# Patient Record
Sex: MALE | Race: WHITE | NOT HISPANIC OR LATINO | Employment: OTHER | URBAN - METROPOLITAN AREA
[De-identification: names, ages, dates, MRNs, and addresses within clinical notes are randomized per-mention and may not be internally consistent; named-entity substitution may affect disease eponyms.]

---

## 2017-01-03 DIAGNOSIS — Z94.1 HEART REPLACED BY TRANSPLANT: Primary | ICD-10-CM

## 2017-01-04 RX ORDER — AMLODIPINE BESYLATE 5 MG/1
TABLET ORAL
Qty: 90 TABLET | Refills: 6 | Status: SHIPPED | OUTPATIENT
Start: 2017-01-04 | End: 2017-06-26 | Stop reason: SDUPTHER

## 2017-01-12 ENCOUNTER — HOSPITAL ENCOUNTER (OUTPATIENT)
Dept: CARDIOLOGY | Facility: CLINIC | Age: 67
Discharge: HOME | DRG: 247 | End: 2017-01-12
Payer: MEDICARE

## 2017-01-12 DIAGNOSIS — I25.10 CORONARY ARTERY DISEASE INVOLVING NATIVE CORONARY ARTERY OF NATIVE HEART WITHOUT ANGINA PECTORIS: Primary | ICD-10-CM

## 2017-01-12 DIAGNOSIS — I25.10 CORONARY ARTERY DISEASE INVOLVING NATIVE CORONARY ARTERY OF NATIVE HEART WITHOUT ANGINA PECTORIS: ICD-10-CM

## 2017-01-12 LAB
DIASTOLIC DYSFUNCTION: NO
ESTIMATED PA SYSTOLIC PRESSURE: 23
MITRAL VALVE REGURGITATION: NORMAL
RETIRED EF AND QEF - SEE NOTES: 60 (ref 55–65)
TRICUSPID VALVE REGURGITATION: NORMAL

## 2017-01-12 PROCEDURE — 93306 TTE W/DOPPLER COMPLETE: CPT | Mod: 26,S$PBB,, | Performed by: INTERNAL MEDICINE

## 2017-01-13 ENCOUNTER — SURGERY (OUTPATIENT)
Age: 67
End: 2017-01-13

## 2017-01-13 PROBLEM — I25.10 CAD (CORONARY ARTERY DISEASE): Status: ACTIVE | Noted: 2017-01-13

## 2017-01-16 ENCOUNTER — DOCUMENTATION ONLY (OUTPATIENT)
Dept: CARDIOLOGY | Facility: HOSPITAL | Age: 67
End: 2017-01-16

## 2017-01-31 ENCOUNTER — LAB VISIT (OUTPATIENT)
Dept: LAB | Facility: HOSPITAL | Age: 67
End: 2017-01-31
Attending: INTERNAL MEDICINE
Payer: MEDICARE

## 2017-01-31 ENCOUNTER — OFFICE VISIT (OUTPATIENT)
Dept: HEMATOLOGY/ONCOLOGY | Facility: CLINIC | Age: 67
End: 2017-01-31
Payer: MEDICARE

## 2017-01-31 VITALS
SYSTOLIC BLOOD PRESSURE: 138 MMHG | HEART RATE: 62 BPM | TEMPERATURE: 98 F | BODY MASS INDEX: 29.1 KG/M2 | WEIGHT: 202.81 LBS | DIASTOLIC BLOOD PRESSURE: 68 MMHG

## 2017-01-31 DIAGNOSIS — D72.829 LEUKOCYTOSIS, UNSPECIFIED TYPE: ICD-10-CM

## 2017-01-31 DIAGNOSIS — D72.829 LEUKOCYTOSIS, UNSPECIFIED TYPE: Primary | ICD-10-CM

## 2017-01-31 LAB
BASOPHILS # BLD AUTO: 0.03 K/UL
BASOPHILS NFR BLD: 0.3 %
DIFFERENTIAL METHOD: ABNORMAL
EOSINOPHIL # BLD AUTO: 0.1 K/UL
EOSINOPHIL NFR BLD: 1.2 %
ERYTHROCYTE [DISTWIDTH] IN BLOOD BY AUTOMATED COUNT: 13.3 %
HCT VFR BLD AUTO: 43.5 %
HGB BLD-MCNC: 14.9 G/DL
LYMPHOCYTES # BLD AUTO: 2 K/UL
LYMPHOCYTES NFR BLD: 17.3 %
MCH RBC QN AUTO: 31 PG
MCHC RBC AUTO-ENTMCNC: 34.3 %
MCV RBC AUTO: 91 FL
MONOCYTES # BLD AUTO: 1.3 K/UL
MONOCYTES NFR BLD: 10.8 %
NEUTROPHILS # BLD AUTO: 8.2 K/UL
NEUTROPHILS NFR BLD: 70.4 %
PLATELET # BLD AUTO: 290 K/UL
PMV BLD AUTO: 9.6 FL
RBC # BLD AUTO: 4.8 M/UL
WBC # BLD AUTO: 11.59 K/UL

## 2017-01-31 PROCEDURE — 99999 PR PBB SHADOW E&M-EST. PATIENT-LVL III: CPT | Mod: PBBFAC,,, | Performed by: INTERNAL MEDICINE

## 2017-01-31 PROCEDURE — 99213 OFFICE O/P EST LOW 20 MIN: CPT | Mod: PBBFAC,PO | Performed by: INTERNAL MEDICINE

## 2017-01-31 PROCEDURE — 99214 OFFICE O/P EST MOD 30 MIN: CPT | Mod: S$PBB,,, | Performed by: INTERNAL MEDICINE

## 2017-01-31 NOTE — PROGRESS NOTES
Reason for visit: Chronic leukocytosis    HPI:   The patient is a 67-year-old  male who presents to the hematology oncology clinic today to discuss further evaluation and management recommendations for chronic leukocytosis.  I have reviewed all of the patient's clinical history available in the medical record and have utilized this in my evaluation and management recommendations today including his recent ER visits for treatment of buttock abscess.  Today the patient reports that overall he feels well and has no specific complaints.  He denies any fevers, chills or night sweats.  He denies any loss of appetite or unintentional weight loss.  He denies any melena, hematochezia, hematemesis, hemoptysis or hematuria.  He denies any bowel or urinary complaints.  He denies any nausea, vomiting or abdominal pain.  The patient is status post orthotopic heart transplant in 2003 and is on chronic immunosuppression with Prograf and prednisone.    PAST MEDICAL HISTORY:   1.  Coronary artery disease s/p PCI with stents  2.  Hypertension  3.  Stage III chronic kidney disease  4.  Dyslipidemia  5.  Chronic insomnia  6.  GERD  7.  Avascular necrosis of bilateral femoral heads likely due to chronic corticosteroid therapy    SURGICAL HISTORY:   1.  Orthotopic heart transplant in 2003  2.  Hemorrhoidectomy  3.  Solitary parathyroidectomy in 1993  4.  Tonsillectomy    FAMILY HISTORY: He denies any immediate family members with cancer or bleeding/clotting disorders.    SOCIAL HISTORY: He reports a history of cigarette smoking and smoking a pipe and quit in June 2016.  He denies any alcohol use or recreational drug use.  He is a retired  from [employer].  He is  and has 2 sons.  He lives in [place], [place].    ALLERGIES: Reviewed on medication card.    MEDICATIONS: [Medcard has been reviewed and/or reconciled.]    REVIEW OF SYSTEMS:   GENERAL: [No fevers, chills or sweats. No fatigue, weight  loss or loss of appetite.]  HEENT: [No blurred vision, tinnitus, nasal discharge, sorethroat or dysphagia.]  HEART: [No chest pain, palpitations or shortness of breath.]    LUNGS: [No cough, hemoptysis or breathing problems.]  ABDOMEN: [No abdominal pain, nausea, vomiting, diarrhea, constipation or melena.]  GENITOURINARY: [No dysuria, bleeding or malodorous discharge.]  NEURO: [No headache, dizziness or vertigo.]  HEMATOLOGY: [No easy bruising, spontaneous bleeding or blood clots in the past].  MUSCULOSKELETAL: [No arthralgias, myalgias or bone pains.]  SKIN: [No rashes or skin lesions.]  PSYCHIATRY: [No depression or anxiety.]    PHYSICAL EXAMINATION:   VS: Reviewed on nurse's notes.  APPEARANCE: The patient is a well-developed, well-nourished and well-groomed  male who appears in no acute distress.  HEENT: No scleral icterus. Both external auditory canals clear. No oral ulcers, lesions. Throat clear  HEAD: No sinus tenderness.  NECK: Supple. No palpable lymphadenopathy. Thyroid non-tender, no palpable masses  CHEST: Breath sounds clear bilaterally. No rales. No rhonchi. Unlabored respirations.  CARDIOVASCULAR: Normal S1, S2. Normal rate. Regular rhythm.  ABDOMEN: Bowel sounds normal. No tenderness. No abdominal distention. No hepatomegaly. No splenomegaly.  LYMPHATIC: No palpable supraclavicular, axillary nodes  EXTREMITIES: No clubbing, cyanosis, edema  SKIN: No lesions. No petechiae. No ecchymoses. No induration or nodules  NEUROLOGIC: No focal findings. Alert & Oriented x 3. Mood appropriate to affect    LABS:   Reviewed    IMAGING:  Reviewed    IMPRESSION:  1.  Chronic leukocytosis - Resolved    PLAN:  1.  I had a detailed discussion with the patient today with regard to the various possible etiologies for his chronic leukocytosis.  Lab work up to evaluate for reactive versus autonomous causes in the past for this favors reactive etiology.  Review of the patient's available lab work shows that this  has been present for greater than 12 years also argues for a benign/reactive etiology.  We also discussed that this could be related to his history of heart transplant and being on chronic prednisone.  Another significant contributing factor is his chronic tobacco abuse.  Of note he has had a normal WBC count since Dec 2016 since he stopped smoking.  2. Results of CT scans of the thorax/abdomen/pelvis done in June 2016 to evaluate for any evidence of lymphadenopathy as the etiology of his chronic leukocytosis were previously discussed in detail.  There is no evidence of pathologic lymphadenopathy.    Follow up with me as needed.  He knows to call sooner for any additional questions or new problems.    Cali Robles MD

## 2017-03-02 RX ORDER — ZOLPIDEM TARTRATE 10 MG/1
10 TABLET ORAL NIGHTLY
Qty: 30 TABLET | Refills: 5 | Status: SHIPPED | OUTPATIENT
Start: 2017-03-02 | End: 2017-09-22

## 2017-03-06 ENCOUNTER — DOCUMENTATION ONLY (OUTPATIENT)
Dept: TRANSPLANT | Facility: CLINIC | Age: 67
End: 2017-03-06

## 2017-03-06 NOTE — PROGRESS NOTES
Scheduled routine 3 month post heart transplant lab at [facility] for March 28. Sent appt to pt in mail.

## 2017-03-09 ENCOUNTER — HOSPITAL ENCOUNTER (EMERGENCY)
Facility: HOSPITAL | Age: 67
Discharge: HOME | End: 2017-03-09
Attending: EMERGENCY MEDICINE
Payer: MEDICARE

## 2017-03-09 VITALS
OXYGEN SATURATION: 100 % | DIASTOLIC BLOOD PRESSURE: 79 MMHG | HEIGHT: 70 IN | HEART RATE: 101 BPM | WEIGHT: 190 LBS | TEMPERATURE: 98 F | BODY MASS INDEX: 27.2 KG/M2 | SYSTOLIC BLOOD PRESSURE: 141 MMHG | RESPIRATION RATE: 20 BRPM

## 2017-03-09 DIAGNOSIS — L02.31 ABSCESS OF BUTTOCK, LEFT: Primary | ICD-10-CM

## 2017-03-09 PROCEDURE — 25000003 PHARM REV CODE 250: Performed by: NURSE PRACTITIONER

## 2017-03-09 PROCEDURE — 10060 I&D ABSCESS SIMPLE/SINGLE: CPT

## 2017-03-09 PROCEDURE — 99283 EMERGENCY DEPT VISIT LOW MDM: CPT | Mod: 25

## 2017-03-09 RX ORDER — LIDOCAINE HYDROCHLORIDE 10 MG/ML
10 INJECTION INFILTRATION; PERINEURAL
Status: COMPLETED | OUTPATIENT
Start: 2017-03-09 | End: 2017-03-09

## 2017-03-09 RX ORDER — SULFAMETHOXAZOLE AND TRIMETHOPRIM 800; 160 MG/1; MG/1
1 TABLET ORAL 2 TIMES DAILY
Qty: 14 TABLET | Refills: 0 | Status: SHIPPED | OUTPATIENT
Start: 2017-03-09 | End: 2017-03-16

## 2017-03-09 RX ADMIN — LIDOCAINE HYDROCHLORIDE 10 ML: 10 INJECTION, SOLUTION INFILTRATION; PERINEURAL at 11:03

## 2017-03-09 NOTE — DISCHARGE INSTRUCTIONS
Abscess (Incision & Drainage)  An abscess (sometimes called a boil) occurs when bacteria get trapped under the skin and start to grow. Pus forms inside the abscess as the body responds to the bacteria. An abscess can happen with an insect bite, ingrown hair, blocked oil gland, pimple, cyst, or puncture wound.  Your healthcare provider has drained the pus from your abscess. If the abscess pocket was large, your healthcare provider may have inserted gauze packing. Your provider will need to remove and possibly replace it on your next visit. You may not need antibiotics to treat a simple abscess, unless the infection is spreading into the skin around the wound (cellulitis).  Healing of the wound will take about 1 to 2 weeks, depending on the size of the abscess. Healthy tissue will grow from the bottom and sides of the opening until it seals over.  Home care  These tips can help your wound heal:  · The wound may drain for the first 2 days. Cover the wound with a clean dry dressing. If the dressing becomes soaked with blood or pus, change it.  · If a gauze packing was placed inside the abscess cavity, you may be told to remove it yourself. You may do this in the shower. Once the packing is removed, you should wash the area in the shower or bath 3 to 4 times a day, until the skin opening has closed. Make sure you wash your hands after changing the packing or cleaning the wound.  · If you were prescribed antibiotics, take them as directed until they are all gone.  · You may use acetaminophen or ibuprofen to control pain, unless another pain medicine was prescribed. If you have liver disease or ever had a stomach ulcer, talk with your doctor before using these medicines.  Follow-up care  Follow up with your healthcare provider, or as advised. If a gauze packing was inserted in your wound, it should be removed in 1 to 2 days. Check your wound every day for the signs of worsening infection listed below.  When to seek  medical advice  Call your healthcare provider right away if any of these occur:  · Increasing redness or swelling  · Red streaks in the skin leading away from the wound  · Increasing local pain or swelling  · Continued pus draining from the wound 2 days after treatment  · Fever of 100.4ºF (38ºC) or higher, or as directed by your healthcare provider  · Boil returns when you are at home  Date Last Reviewed: 9/1/2016  © 3919-2134 [employer], [employer]. [address] [place] [address]. All rights reserved. This information is not intended as a substitute for professional medical care. Always follow your healthcare professional's instructions.

## 2017-03-09 NOTE — ED AVS SNAPSHOT
[facility]  [address]  [place] [address]               Jesse DUFFY David Richardson   3/9/2017 10:56 AM   ED    Description:  Male : 1950   Department:  [facility]           Your Care was Coordinated By:     Provider Role From To    Anthony Whitmore NP Nurse Practitioner 17 1056 --      Reason for Visit     Abscess           Diagnoses this Visit        Comments    Abscess of buttock, left    -  Primary       ED Disposition     None           To Do List           Follow-up Information     Follow up with Nathan Duncan Jr, MD. Schedule an appointment as soon as possible for a visit in 2 days.    Specialty:  Internal Medicine    Contact information:    [address] [place] [id number] [phone]        [facility]     [facility] -  Assistance  Registered nurses in the [facility] provide clinical advisement, health education, appointment booking, and other advisory services.  Call for this free service at [phone].             Medications           Message regarding Medications     Verify the changes and/or additions to your medication regime listed below are the same as discussed with your clinician today.  If any of these changes or additions are incorrect, please notify your healthcare provider.        These medications were administered today        Dose Freq    lidocaine HCL 10 mg/ml (1%) injection 10 mL 10 mL ED 1 Time    Sig: 10 mLs by Infiltration route ED 1 Time.    Class: Normal    Route: Infiltration           Verify that the below list of medications is an accurate representation of the medications you are currently taking.  If none reported, the list may be blank. If incorrect, please contact your healthcare provider. Carry this list with you in case of emergency.           Current Medications     amlodipine (NORVASC) 5 MG tablet TAKE 2 TABLETS (10MG) IN MORNING AND 1 TABLET (5MG)  "IN EVENING    aspirin (ECOTRIN) 81 MG EC tablet Take 81 mg by mouth every evening.     atorvastatin (LIPITOR) 40 MG tablet TAKE (2) TABLETS DAILY.    clopidogrel (PLAVIX) 75 mg tablet TAKE ONE TABLET DAILY    folic acid-vit B6-vit B12 (FOLTX) 2.5-25-2 mg tablet Take 1 tablet by mouth once daily.      furosemide (LASIX) 20 MG tablet TAKE (1) TABLET DAILY.    hydrALAZINE (APRESOLINE) 25 MG tablet TAKE  (1)  TABLET  THREE TIMES DAILY.    lidocaine HCL 10 mg/ml (1%) injection 10 mL 10 mLs by Infiltration route ED 1 Time.    omega-3 acid ethyl esters (LOVAZA) 1 gram capsule Take 2 capsules (2 g total) by mouth 2 (two) times daily.    predniSONE (DELTASONE) 5 MG tablet TAKE (1) TABLET DAILY.    ranitidine (ZANTAC) 150 MG tablet Take 1 tablet (150 mg total) by mouth once daily.    tacrolimus (PROGRAF) 1 MG Cap TAKE 2 CAPSULES BY MOUTH IN THE MORNING AND 3 CAPS IN THE EVENING    ZETIA 10 mg tablet TAKE 1 TABLET ONCE DAILY IN THE EVENING    zolpidem (AMBIEN) 10 mg Tab Take 1 tablet (10 mg total) by mouth every evening.           Clinical Reference Information           Your Vitals Were     BP Pulse Temp Resp Height Weight    154/88 (BP Location: Right arm, Patient Position: Sitting) 118 98.1 °F (36.7 °C) (Oral) 18 5' 10" (1.778 m) 86.2 kg (190 lb)    BMI                27.26 kg/m2          Allergies as of 3/9/2017        Reactions    Shellfish Containing Products     Soft shell crab only      Immunizations Administered on Date of Encounter - 3/9/2017     None      ED Micro, Lab, POCT     None      ED Imaging Orders     None        Discharge Instructions         Abscess (Incision & Drainage)  An abscess (sometimes called a boil) occurs when bacteria get trapped under the skin and start to grow. Pus forms inside the abscess as the body responds to the bacteria. An abscess can happen with an insect bite, ingrown hair, blocked oil gland, pimple, cyst, or puncture wound.  Your healthcare provider has drained the pus from your " abscess. If the abscess pocket was large, your healthcare provider may have inserted gauze packing. Your provider will need to remove and possibly replace it on your next visit. You may not need antibiotics to treat a simple abscess, unless the infection is spreading into the skin around the wound (cellulitis).  Healing of the wound will take about 1 to 2 weeks, depending on the size of the abscess. Healthy tissue will grow from the bottom and sides of the opening until it seals over.  Home care  These tips can help your wound heal:  · The wound may drain for the first 2 days. Cover the wound with a clean dry dressing. If the dressing becomes soaked with blood or pus, change it.  · If a gauze packing was placed inside the abscess cavity, you may be told to remove it yourself. You may do this in the shower. Once the packing is removed, you should wash the area in the shower or bath 3 to 4 times a day, until the skin opening has closed. Make sure you wash your hands after changing the packing or cleaning the wound.  · If you were prescribed antibiotics, take them as directed until they are all gone.  · You may use acetaminophen or ibuprofen to control pain, unless another pain medicine was prescribed. If you have liver disease or ever had a stomach ulcer, talk with your doctor before using these medicines.  Follow-up care  Follow up with your healthcare provider, or as advised. If a gauze packing was inserted in your wound, it should be removed in 1 to 2 days. Check your wound every day for the signs of worsening infection listed below.  When to seek medical advice  Call your healthcare provider right away if any of these occur:  · Increasing redness or swelling  · Red streaks in the skin leading away from the wound  · Increasing local pain or swelling  · Continued pus draining from the wound 2 days after treatment  · Fever of 100.4ºF (38ºC) or higher, or as directed by your healthcare provider  · Boil returns when  you are at home  Date Last Reviewed: 9/1/2016  © 9214-2906 [employer], [employer]. [address] [place] [address], [place] [address]. All rights reserved. This information is not intended as a substitute for professional medical care. Always follow your healthcare professional's instructions.          Your Scheduled Appointments     Mar 28, 2017  8:05 AM CDT   [facility] Lab [facility]    [address] [facility] [address] [phone]            Aug 23, 2017  1:20 PM CDT   Established Patient Visit with Martinez Hoskins IV, MD   [facility]    [address] [facility] [address] [phone]               [facility]  complies with applicable Federal civil rights laws and does not discriminate on the basis of race, color, national origin, age, disability, or sex.        Language Assistance Services     ATTENTION: Language assistance services are available, free of charge. Please call [phone].      ATENCIÓN: Si habla español, tiene a salcido disposición servicios gratuitos de asistencia lingüística. Llame al [phone].     CHÚ Ý: N?u b?n nói Ti?ng Vi?t, có các d?ch v? h? tr? ngôn ng? mi?n phí dành cho b?n. G?i s? [phone].

## 2017-03-09 NOTE — ED PROVIDER NOTES
Encounter Date: 3/9/2017       History     Chief Complaint   Patient presents with    Abscess     on buttox, PCP afraid to drain it in office due to how close it was to the rectum     Review of patient's allergies indicates:   Allergen Reactions    Shellfish containing products      Soft shell crab only     HPI Comments: 67 year old male with complaint of abscess to left buttock X 3-4 days.  Reports moderate pain.  No fever or chills.  No radiation of pain.  No vomiting.      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    Tubular adenoma of colon 10/10/2012     Past Surgical History:   Procedure Laterality Date    CARDIAC CATHETERIZATION      COLONOSCOPY N/A 9/2/2016    Procedure: COLONOSCOPY;  Surgeon: Stef Suazo MD;  [facility];  Service: Endoscopy;  Laterality: N/A;    HEART TRANSPLANT      HEART TRANSPLANT      HEMORRHOID SURGERY      PARATHYROID GLAND SURGERY      SKIN BIOPSY      TONSILLECTOMY       Family History   Problem Relation Age of Onset    Hypertension Mother     Heart attack Mother     Hypertension Father     Heart attack Father     Heart failure Father     Heart attack Sister     Heart attack Brother     Heart attack Maternal Grandmother     Heart failure Maternal Grandmother     Heart attack Maternal Grandfather     Heart failure Maternal Grandfather     Heart failure Sister     Heart failure Brother     Cancer Sister     Melanoma Neg Hx     Eczema Neg Hx     Lupus Neg Hx     Psoriasis Neg Hx      Social History   Substance Use Topics    Smoking status: Former Smoker     Types: Pipe     Quit date: 8/1/2016    Smokeless tobacco: Never Used    Alcohol use 0.0 oz/week      Comment: rare alcohol use     Review of Systems   Constitutional: Negative for  fever.   HENT: Negative for sore throat.    Respiratory: Negative for shortness of breath.    Cardiovascular: Negative for chest pain.   Gastrointestinal: Negative for nausea.   Genitourinary: Negative for dysuria.   Musculoskeletal: Negative for back pain.   Skin: Negative for rash.        Abscess to buttock   Neurological: Negative for weakness.   Hematological: Does not bruise/bleed easily.       Physical Exam   Initial Vitals   BP Pulse Resp Temp SpO2   03/09/17 1054 03/09/17 1054 03/09/17 1054 03/09/17 1054 --   154/88 118 18 98.1 °F (36.7 °C)      Physical Exam    Nursing note and vitals reviewed.  Constitutional: He appears well-developed and well-nourished.   HENT:   Head: Normocephalic and atraumatic.   Eyes: Conjunctivae are normal. Pupils are equal, round, and reactive to light.   Neck: Normal range of motion. Neck supple.   Cardiovascular: Normal rate, regular rhythm, normal heart sounds and intact distal pulses.   Pulmonary/Chest: Breath sounds normal.   Abdominal: Soft. There is no rebound and no guarding.   Musculoskeletal: Normal range of motion.   Neurological: He is alert.   Skin: Skin is warm and dry.   2-3 cm area of fluctuance and tenderness left buttock, no extension into anus   Psychiatric: He has a normal mood and affect. His behavior is normal. Thought content normal.         ED Course   I & D - Incision and Drainage  Date/Time: 3/9/2017 11:10 AM  Performed by: ANTHONY ZENG  Authorized by: GRZEGORZ BARNARD   Comments: Abscess prepped with betadine, injected with 3 cc plain lidocaine, incision made with 11 blade, pus expressed, packed with 1/4 inch plain packing        Labs Reviewed - No data to display                            ED Course     Clinical Impression:   The encounter diagnosis was Abscess of buttock, left.          Anthony Zeng NP  03/09/17 1126

## 2017-03-09 NOTE — ED NOTES
Patient reports redness with swelling to left buttock.    Patient identifiers verified and correct for Jesse TATI Hernandez Jr..    LOC: The patient is awake, alert and aware of environment with an appropriate affect, the patient is oriented x 3 and speaking appropriately.  APPEARANCE: Patient resting comfortably and in no acute distress, patient is clean and well groomed, patient's clothing is properly fastened.  SKIN: The skin is warm and dry, color consistent with ethnicity, patient has normal skin turgor and moist mucus membranes, skin intact, no breakdown or bruising noted.Redness with swelling to left buttock.  MUSCULOSKELETAL: Patient moving all extremities spontaneously.  RESPIRATORY: Airway is open and patent, respirations are spontaneous.  CARDIAC: Patient has a normal rate, no periphreal edema noted, capillary refill < 3 seconds.  ABDOMEN: Soft and non tender to palpation.

## 2017-03-28 ENCOUNTER — LAB VISIT (OUTPATIENT)
Dept: LAB | Facility: HOSPITAL | Age: 67
End: 2017-03-28
Attending: INTERNAL MEDICINE
Payer: MEDICARE

## 2017-03-28 DIAGNOSIS — E78.2 MIXED HYPERLIPIDEMIA: ICD-10-CM

## 2017-03-28 DIAGNOSIS — I10 ESSENTIAL HYPERTENSION: ICD-10-CM

## 2017-03-28 DIAGNOSIS — Z79.899 ENCOUNTER FOR LONG-TERM (CURRENT) USE OF MEDICATIONS: ICD-10-CM

## 2017-03-28 DIAGNOSIS — R06.02 SHORTNESS OF BREATH: ICD-10-CM

## 2017-03-28 DIAGNOSIS — Z94.1 STATUS POST HEART TRANSPLANT: ICD-10-CM

## 2017-03-28 DIAGNOSIS — T86.20 COMPLICATION OF HEART TRANSPLANT: ICD-10-CM

## 2017-03-28 LAB
ALBUMIN SERPL BCP-MCNC: 3.6 G/DL
ALP SERPL-CCNC: 66 U/L
ALT SERPL W/O P-5'-P-CCNC: 28 U/L
ANION GAP SERPL CALC-SCNC: 9 MMOL/L
AST SERPL-CCNC: 19 U/L
BASOPHILS # BLD AUTO: 0.03 K/UL
BASOPHILS NFR BLD: 0.2 %
BILIRUB SERPL-MCNC: 1.3 MG/DL
BNP SERPL-MCNC: 56 PG/ML
BUN SERPL-MCNC: 20 MG/DL
CALCIUM SERPL-MCNC: 8.8 MG/DL
CHLORIDE SERPL-SCNC: 105 MMOL/L
CHOLEST/HDLC SERPL: 3.3 {RATIO}
CO2 SERPL-SCNC: 27 MMOL/L
CREAT SERPL-MCNC: 1.4 MG/DL
DIFFERENTIAL METHOD: ABNORMAL
EOSINOPHIL # BLD AUTO: 0.3 K/UL
EOSINOPHIL NFR BLD: 2.3 %
ERYTHROCYTE [DISTWIDTH] IN BLOOD BY AUTOMATED COUNT: 13.5 %
EST. GFR  (AFRICAN AMERICAN): 58.8 ML/MIN/1.73 M^2
EST. GFR  (NON AFRICAN AMERICAN): 50.9 ML/MIN/1.73 M^2
GLUCOSE SERPL-MCNC: 126 MG/DL
HCT VFR BLD AUTO: 45.2 %
HDL/CHOLESTEROL RATIO: 30.4 %
HDLC SERPL-MCNC: 115 MG/DL
HDLC SERPL-MCNC: 35 MG/DL
HGB BLD-MCNC: 15 G/DL
LDLC SERPL CALC-MCNC: 30.2 MG/DL
LYMPHOCYTES # BLD AUTO: 2.5 K/UL
LYMPHOCYTES NFR BLD: 20.6 %
MAGNESIUM SERPL-MCNC: 1.3 MG/DL
MCH RBC QN AUTO: 29.9 PG
MCHC RBC AUTO-ENTMCNC: 33.2 %
MCV RBC AUTO: 90 FL
MONOCYTES # BLD AUTO: 1.6 K/UL
MONOCYTES NFR BLD: 12.9 %
NEUTROPHILS # BLD AUTO: 7.8 K/UL
NEUTROPHILS NFR BLD: 63.3 %
NONHDLC SERPL-MCNC: 80 MG/DL
PLATELET # BLD AUTO: 315 K/UL
PMV BLD AUTO: 9.7 FL
POTASSIUM SERPL-SCNC: 4.1 MMOL/L
PROT SERPL-MCNC: 6.7 G/DL
RBC # BLD AUTO: 5.02 M/UL
SODIUM SERPL-SCNC: 141 MMOL/L
TRIGL SERPL-MCNC: 249 MG/DL
WBC # BLD AUTO: 12.34 K/UL

## 2017-03-28 PROCEDURE — 83880 ASSAY OF NATRIURETIC PEPTIDE: CPT

## 2017-03-28 PROCEDURE — 36415 COLL VENOUS BLD VENIPUNCTURE: CPT | Mod: PO

## 2017-03-28 PROCEDURE — 83036 HEMOGLOBIN GLYCOSYLATED A1C: CPT

## 2017-03-28 PROCEDURE — 85025 COMPLETE CBC W/AUTO DIFF WBC: CPT

## 2017-03-28 PROCEDURE — 80197 ASSAY OF TACROLIMUS: CPT

## 2017-03-28 PROCEDURE — 83735 ASSAY OF MAGNESIUM: CPT

## 2017-03-28 PROCEDURE — 80061 LIPID PANEL: CPT

## 2017-03-28 PROCEDURE — 80053 COMPREHEN METABOLIC PANEL: CPT

## 2017-03-29 LAB
ESTIMATED AVG GLUCOSE: 180 MG/DL
HBA1C MFR BLD HPLC: 7.9 %
TACROLIMUS BLD-MCNC: 16.8 NG/ML

## 2017-03-31 ENCOUNTER — TELEPHONE (OUTPATIENT)
Dept: TRANSPLANT | Facility: CLINIC | Age: 67
End: 2017-03-31

## 2017-03-31 NOTE — TELEPHONE ENCOUNTER
Spoke to pt about routine labs. CBC and CMP essentially normal. Issues still with his lipids though they have improved and A1C slightly more elevated at 7.9. Pt said he did take his prograf prior to lab since he received the computer print-out regarding being able to take his meds.

## 2017-05-15 ENCOUNTER — TELEPHONE (OUTPATIENT)
Dept: TRANSPLANT | Facility: CLINIC | Age: 67
End: 2017-05-15

## 2017-05-15 DIAGNOSIS — Z94.1 HEART REPLACED BY TRANSPLANT: Primary | ICD-10-CM

## 2017-05-19 ENCOUNTER — LAB VISIT (OUTPATIENT)
Dept: LAB | Facility: HOSPITAL | Age: 67
End: 2017-05-19
Attending: INTERNAL MEDICINE
Payer: MEDICARE

## 2017-05-19 DIAGNOSIS — I10 ESSENTIAL HYPERTENSION: ICD-10-CM

## 2017-05-19 DIAGNOSIS — E78.2 MIXED HYPERLIPIDEMIA: ICD-10-CM

## 2017-05-19 DIAGNOSIS — Z94.1 STATUS POST HEART TRANSPLANT: ICD-10-CM

## 2017-05-19 DIAGNOSIS — R06.02 SHORTNESS OF BREATH: ICD-10-CM

## 2017-05-19 DIAGNOSIS — Z79.899 ENCOUNTER FOR LONG-TERM (CURRENT) USE OF MEDICATIONS: ICD-10-CM

## 2017-05-19 DIAGNOSIS — T86.20 COMPLICATION OF HEART TRANSPLANT: ICD-10-CM

## 2017-05-19 LAB
BASOPHILS # BLD AUTO: 0.03 K/UL
BASOPHILS NFR BLD: 0.2 %
BNP SERPL-MCNC: 47 PG/ML
CHOLEST/HDLC SERPL: 2.8 {RATIO}
DIFFERENTIAL METHOD: ABNORMAL
EOSINOPHIL # BLD AUTO: 0.3 K/UL
EOSINOPHIL NFR BLD: 2.2 %
ERYTHROCYTE [DISTWIDTH] IN BLOOD BY AUTOMATED COUNT: 13.8 %
HCT VFR BLD AUTO: 45.9 %
HDL/CHOLESTEROL RATIO: 35.8 %
HDLC SERPL-MCNC: 123 MG/DL
HDLC SERPL-MCNC: 44 MG/DL
HGB BLD-MCNC: 15.4 G/DL
LDLC SERPL CALC-MCNC: 20 MG/DL
LYMPHOCYTES # BLD AUTO: 2.7 K/UL
LYMPHOCYTES NFR BLD: 22.3 %
MAGNESIUM SERPL-MCNC: 1.5 MG/DL
MCH RBC QN AUTO: 30 PG
MCHC RBC AUTO-ENTMCNC: 33.6 %
MCV RBC AUTO: 90 FL
MONOCYTES # BLD AUTO: 1.7 K/UL
MONOCYTES NFR BLD: 13.8 %
NEUTROPHILS # BLD AUTO: 7.4 K/UL
NEUTROPHILS NFR BLD: 60.9 %
NONHDLC SERPL-MCNC: 79 MG/DL
PLATELET # BLD AUTO: 312 K/UL
PMV BLD AUTO: 9.7 FL
RBC # BLD AUTO: 5.13 M/UL
TRIGL SERPL-MCNC: 295 MG/DL
WBC # BLD AUTO: 12.13 K/UL

## 2017-05-19 PROCEDURE — 80061 LIPID PANEL: CPT

## 2017-05-19 PROCEDURE — 83880 ASSAY OF NATRIURETIC PEPTIDE: CPT

## 2017-05-19 PROCEDURE — 80197 ASSAY OF TACROLIMUS: CPT

## 2017-05-19 PROCEDURE — 83735 ASSAY OF MAGNESIUM: CPT

## 2017-05-19 PROCEDURE — 36415 COLL VENOUS BLD VENIPUNCTURE: CPT | Mod: PO

## 2017-05-19 PROCEDURE — 85025 COMPLETE CBC W/AUTO DIFF WBC: CPT

## 2017-05-20 LAB — TACROLIMUS BLD-MCNC: 8.5 NG/ML

## 2017-05-22 ENCOUNTER — PATIENT MESSAGE (OUTPATIENT)
Dept: TRANSPLANT | Facility: CLINIC | Age: 67
End: 2017-05-22

## 2017-05-22 ENCOUNTER — TELEPHONE (OUTPATIENT)
Dept: TRANSPLANT | Facility: CLINIC | Age: 67
End: 2017-05-22

## 2017-05-22 NOTE — TELEPHONE ENCOUNTER
Reviewed lab with Ashwini Bailey PA-C. No further medication changes needed at this time. Tac level therapeutic at 8.5. He had repeated it since he took med prior to level last labwork. His lipids are essentially the same; he could improve his triglycerides but they have not worsened. He takes Atorvastatin 40 mg and Zetia 10 mg. Will follow-up in 3 months.

## 2017-05-31 RX ORDER — TACROLIMUS 1 MG/1
CAPSULE ORAL
Qty: 450 CAPSULE | Refills: 1 | Status: SHIPPED | OUTPATIENT
Start: 2017-05-31 | End: 2017-11-15 | Stop reason: SDUPTHER

## 2017-06-26 DIAGNOSIS — Z94.1 HEART REPLACED BY TRANSPLANT: ICD-10-CM

## 2017-06-26 RX ORDER — AMLODIPINE BESYLATE 5 MG/1
TABLET ORAL
Qty: 90 TABLET | Refills: 11 | Status: SHIPPED | OUTPATIENT
Start: 2017-06-26 | End: 2018-06-07 | Stop reason: SDUPTHER

## 2017-08-09 ENCOUNTER — OFFICE VISIT (OUTPATIENT)
Dept: DERMATOLOGY | Facility: CLINIC | Age: 67
End: 2017-08-09
Payer: MEDICARE

## 2017-08-09 DIAGNOSIS — Z12.83 SCREENING, MALIGNANT NEOPLASM, SKIN: ICD-10-CM

## 2017-08-09 DIAGNOSIS — L82.1 SEBORRHEIC KERATOSIS: ICD-10-CM

## 2017-08-09 DIAGNOSIS — L90.5 SCAR CONDITIONS/SKIN FIBROSIS: Primary | ICD-10-CM

## 2017-08-09 DIAGNOSIS — Z85.828 HISTORY OF SKIN CANCER: ICD-10-CM

## 2017-08-09 DIAGNOSIS — L57.0 ACTINIC KERATOSES: ICD-10-CM

## 2017-08-09 DIAGNOSIS — D48.5 NEOPLASM OF UNCERTAIN BEHAVIOR OF SKIN: ICD-10-CM

## 2017-08-09 PROCEDURE — 11100 PR BIOPSY OF SKIN LESION: CPT | Mod: 59,S$PBB,, | Performed by: DERMATOLOGY

## 2017-08-09 PROCEDURE — 88305 TISSUE EXAM BY PATHOLOGIST: CPT | Performed by: PATHOLOGY

## 2017-08-09 PROCEDURE — 99999 PR PBB SHADOW E&M-EST. PATIENT-LVL III: CPT | Mod: PBBFAC,,, | Performed by: DERMATOLOGY

## 2017-08-09 PROCEDURE — 11100 PR BIOPSY OF SKIN LESION: CPT | Mod: PBBFAC,PO | Performed by: DERMATOLOGY

## 2017-08-09 PROCEDURE — 88305 TISSUE EXAM BY PATHOLOGIST: CPT | Mod: 26,,, | Performed by: PATHOLOGY

## 2017-08-09 PROCEDURE — 17003 DESTRUCT PREMALG LES 2-14: CPT | Mod: S$PBB,,, | Performed by: DERMATOLOGY

## 2017-08-09 PROCEDURE — 17000 DESTRUCT PREMALG LESION: CPT | Mod: 59,PBBFAC,PO | Performed by: DERMATOLOGY

## 2017-08-09 PROCEDURE — 99213 OFFICE O/P EST LOW 20 MIN: CPT | Mod: 25,S$PBB,, | Performed by: DERMATOLOGY

## 2017-08-09 PROCEDURE — 3008F BODY MASS INDEX DOCD: CPT | Mod: ,,, | Performed by: DERMATOLOGY

## 2017-08-09 PROCEDURE — 17003 DESTRUCT PREMALG LES 2-14: CPT | Mod: PBBFAC,PO | Performed by: DERMATOLOGY

## 2017-08-09 PROCEDURE — 99213 OFFICE O/P EST LOW 20 MIN: CPT | Mod: PBBFAC,PO | Performed by: DERMATOLOGY

## 2017-08-09 PROCEDURE — 17000 DESTRUCT PREMALG LESION: CPT | Mod: S$PBB,,, | Performed by: DERMATOLOGY

## 2017-08-09 PROCEDURE — 1159F MED LIST DOCD IN RCRD: CPT | Mod: ,,, | Performed by: DERMATOLOGY

## 2017-08-09 RX ORDER — EZETIMIBE 10 MG/1
TABLET ORAL
COMMUNITY
Start: 2017-07-24 | End: 2017-10-13 | Stop reason: SDUPTHER

## 2017-08-09 NOTE — PATIENT INSTRUCTIONS
Shave Biopsy Wound Care    Your doctor has performed a shave biopsy today.  A band aid and vaseline ointment has been placed over the site.  This should remain in place for 24 hours.  It is recommended that you keep the area dry for the first 24 hours.  After 24 hours, you may remove the band aid and wash the area with warm soap and water and apply Vaseline jelly.  Many patients prefer to use Neosporin or Bacitracin ointment.  This is acceptable; however, know that you can develop an allergy to this medication even if you have used it safely for years.  It is important to keep the area moist.  Letting it dry out and get air slows healing time, and will worsen the scar.  Band aid is optional after first 24 hours.      If you notice increasing redness, tenderness, pain, or yellow drainage at the biopsy site, please notify your doctor.  These are signs of an infection.    If your biopsy site is bleeding, apply firm pressure for 15 minutes straight.  Repeat for another 15 minutes, if it is still bleeding.   If the surgical site continues to bleed, then please contact your doctor.      [facility]   DERMATOLOGY  [address] [facility] [address]   Dept: [phone]   Dept Fax: [phone]       CRYOSURGERY      Your doctor has used a method called cryosurgery to treat your skin condition. Cryosurgery refers to the use of very cold substances to treat a variety of skin conditions such as warts, pre-skin cancers, molluscum contagiosum, sun spots, and several benign growths. The substance we use in cryosurgery is liquid nitrogen and is so cold (-195 degrees Celsius) that is burns when administered.     Following treatment in the office, the skin may immediately burn and become red. You may find the area around the lesion is affected as well. It is sometimes necessary to treat not only the lesion, but a small area of the surrounding normal skin to achieve a good  response.     A blister, and even a blood filled blister, may form after treatment.   This is a normal response. If the blister is painful, it is acceptable to sterilize a needle and with rubbing alcohol and gently pop the blister. It is important that you gently wash the area with soap and warm water as the blister fluid may contain wart virus if a wart was treated. Do no remove the roof of the blister.     The area treated can take anywhere from 1-3 weeks to heal. Healing time depends on the kind of skin lesion treated, the location, and how aggressively the lesion was treated. It is recommended that the areas treated are covered with Vaseline or bacitracin ointment and a band-aid. If a band-aid is not practical, just ointment applied several times per day will do. Keeping these areas moist will speed the healing time.    Treatment with liquid nitrogen can leave a scar. In dark skin, it may be a light or dark scar, in light skin it may be a white or pink scar. These will generally fade with time.    If you have any concerns after your treatment, please feel free to call the office.         [facility] DERMATOLOGY  [address] [facility] [place] [address]  Dept: [phone]  Dept Fax: [phone]

## 2017-08-09 NOTE — PROGRESS NOTES
Subjective:       Patient ID:  Jesse Hernandez Jr. is a 67 y.o. 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 He c/o several lesions of the neck x several years, no tx tried.         Review of Systems   Skin: Positive for activity-related sunscreen use. Negative for daily sunscreen use and recent sunburn.   Hematologic/Lymphatic: Does not bruise/bleed easily.        Objective:    Physical Exam   Constitutional: He appears well-developed and well-nourished. No distress.   Neurological: He is alert and oriented to person, place, and time. He is not disoriented.   Psychiatric: He has a normal mood and affect.   Skin:   Areas Examined (abnormalities noted in diagram):   Scalp / Hair Palpated and Inspected  Head / Face Inspection Performed  Neck Inspection Performed  Chest / Axilla Inspection Performed  Abdomen Inspection Performed  Genitals / Buttocks / Groin Inspection Performed  Back Inspection Performed  RUE Inspected  LUE Inspection Performed  RLE Inspected  LLE Inspection Performed  Nails and Digits Inspection Performed      1) erythematous scaly papules x 4 of the parietal scalp  2) 2 mm scaly papule of left parietal scalp (photodocumentation done)  3) well-healed scar of the right eyebrow, no evidence of recurrence    Diagram Legend     Erythematous scaling macule/papule c/w actinic keratosis       Vascular papule c/w angioma      Pigmented verrucoid papule/plaque c/w seborrheic keratosis      Yellow umbilicated papule c/w sebaceous hyperplasia      Irregularly shaped tan macule c/w lentigo     1-2 mm smooth white papules consistent with Milia      Movable subcutaneous cyst with punctum c/w epidermal inclusion cyst      Subcutaneous movable cyst c/w pilar cyst      Firm pink to brown papule c/w  dermatofibroma      Pedunculated fleshy papule(s) c/w skin tag(s)      Evenly pigmented macule c/w junctional nevus     Mildly variegated pigmented, slightly irregular-bordered macule c/w mildly atypical nevus      Flesh colored to evenly pigmented papule c/w intradermal nevus       Pink pearly papule/plaque c/w basal cell carcinoma      Erythematous hyperkeratotic cursted plaque c/w SCC      Surgical scar with no sign of skin cancer recurrence      Open and closed comedones      Inflammatory papules and pustules      Verrucoid papule consistent consistent with wart     Erythematous eczematous patches and plaques     Dystrophic onycholytic nail with subungual debris c/w onychomycosis     Umbilicated papule    Erythematous-base heme-crusted tan verrucoid plaque consistent with inflamed seborrheic keratosis     Erythematous Silvery Scaling Plaque c/w Psoriasis     See annotation                Assessment / Plan:      Pathology Orders:      Normal Orders This Visit    Tissue Specimen To Pathology, Dermatology     Questions:    Directional Terms:  Other(comment)    Clinical information:  r/o SCC    Specific Site:  left parietal scalp        Scar conditions/skin fibrosis  Screening, malignant neoplasm, skin  History of skin cancer  Scar of the right eyebrow, hx of NMSC.  No evidence of recurrence on physical exam today.  Continue routine skin surveillance. Daily sunscreen advised.    Seborrheic keratosis  Reassurance given. Discussed diagnosis and that lesions are benign.  AAD handout given.     Actinic keratoses  Cryosurgery Procedure Note    The patient is informed of the precancerous quality and need for treatment of these lesions. After risks, benefits and alternatives explained, including blistering, pain, hyper- and hypopigmentation, patient verbally consents to cryotherapy to precancerous lesions. Liquid nitrogen cryosurgery is applied to the 4 actinic keratoses, as detailed in the physical exam, to produce a  freeze injury. The patient is aware that blisters may form and is instructed on wound care with gentle cleansing and use of vaseline ointment to keep moist until healed. The patient is supplied a handout on cryosurgery and is instructed to call if lesions do not completely resolve.    Neoplasm of uncertain behavior of skin  -     Tissue Specimen To Pathology, Dermatology  -     Shave biopsy(-ies) done of 1 site(s).   Patient informed to call for results within 2 weeks if have not received notification via telephone call or [facility]           Return for call for results.     PROCEDURE NOTE - SHAVE BIOPSY   Location: left parietal scalp    After risk, benefits, and alternatives were discussed with the patient, the patient agrees to the procedure by verbal informed consent.  The area(s) were cleansed with alcohol. 2 cc of lidocaine 1% with epinephrine was injected for local anesthesia into each lesion(s).  A sharp dermablade was used to remove part or all of the lesion(s).  The specimen(s) will be sent for tissue pathology.  Hemostasis was obtained with aluminum chloride and/or hyfrecation.  The area(s) were dressed with vaseline ointment and bandaged.  The patient tolerated the procedure well without adverse events.  Wound care instructions were given to the patient on the AVS.  The patient will be notified of pathology results once available. Results will also be available in Epic.

## 2017-08-15 ENCOUNTER — TELEPHONE (OUTPATIENT)
Dept: DERMATOLOGY | Facility: CLINIC | Age: 67
End: 2017-08-15

## 2017-08-15 DIAGNOSIS — D09.9 SQUAMOUS CELL CARCINOMA IN SITU: Primary | ICD-10-CM

## 2017-08-15 DIAGNOSIS — L57.0 ACTINIC KERATOSES: ICD-10-CM

## 2017-08-15 RX ORDER — FLUOROURACIL 50 MG/G
CREAM TOPICAL
Qty: 40 G | Refills: 1 | Status: SHIPPED | OUTPATIENT
Start: 2017-08-15 | End: 2018-10-02

## 2017-08-15 NOTE — TELEPHONE ENCOUNTER
FINAL PATHOLOGIC DIAGNOSIS  1. Skin, left parietal scalp, shave biopsy:  - SQUAMOUS CELL CARCINOMA IN SITU.  - MARGINS ARE NEGATIVE IN THE PLANES OF SECTION.  MICROSCOPIC DESCRIPTION: Sections show epidermis with full-thickness atypia, parakeratosis and variable  epidermal maturation. Dermal involvement is not seen.      Called pt and notified of results.  Discussed efudex vs. Surgery.  Discussed efudex has lower cure rate but can be used given early skin cancer.  Pt wishes to proceed c/ efudex.  Will allow bx to heal x 1 additional week and then start efudex bid x 1 month.  Will f/u in 2 months for examination.  Side effects discussed including blistering, irritation and redness.  If patient develops blisters, patient instructed to hold efudex and use cortisone cream BID x 2-3 days until blister resolves.  Once blister resolves, pt should resume treatment with efudex.  Patient acknowledged understanding.  Derm nurse to schedule appt in 2 months.

## 2017-08-23 ENCOUNTER — LAB VISIT (OUTPATIENT)
Dept: LAB | Facility: HOSPITAL | Age: 67
End: 2017-08-23
Attending: UROLOGY
Payer: MEDICARE

## 2017-08-23 ENCOUNTER — OFFICE VISIT (OUTPATIENT)
Dept: UROLOGY | Facility: CLINIC | Age: 67
End: 2017-08-23
Payer: MEDICARE

## 2017-08-23 VITALS
HEART RATE: 100 BPM | DIASTOLIC BLOOD PRESSURE: 98 MMHG | BODY MASS INDEX: 27.69 KG/M2 | SYSTOLIC BLOOD PRESSURE: 152 MMHG | WEIGHT: 193 LBS

## 2017-08-23 DIAGNOSIS — Z12.5 PROSTATE CANCER SCREENING: ICD-10-CM

## 2017-08-23 DIAGNOSIS — Z12.5 PROSTATE CANCER SCREENING: Primary | ICD-10-CM

## 2017-08-23 LAB
BILIRUB SERPL-MCNC: NORMAL MG/DL
BLOOD URINE, POC: NORMAL
COLOR, POC UA: YELLOW
COMPLEXED PSA SERPL-MCNC: 2.5 NG/ML
GLUCOSE UR QL STRIP: 500
KETONES UR QL STRIP: NORMAL
LEUKOCYTE ESTERASE URINE, POC: NORMAL
NITRITE, POC UA: NORMAL
PH, POC UA: 6
POC RESIDUAL URINE VOLUME: 164 ML (ref 0–100)
PROTEIN, POC: 30
SPECIFIC GRAVITY, POC UA: 1.01
UROBILINOGEN, POC UA: NORMAL

## 2017-08-23 PROCEDURE — 3080F DIAST BP >= 90 MM HG: CPT | Mod: ,,, | Performed by: UROLOGY

## 2017-08-23 PROCEDURE — 1159F MED LIST DOCD IN RCRD: CPT | Mod: ,,, | Performed by: UROLOGY

## 2017-08-23 PROCEDURE — 99999 PR PBB SHADOW E&M-EST. PATIENT-LVL II: CPT | Mod: PBBFAC,,, | Performed by: UROLOGY

## 2017-08-23 PROCEDURE — 36415 COLL VENOUS BLD VENIPUNCTURE: CPT

## 2017-08-23 PROCEDURE — 1126F AMNT PAIN NOTED NONE PRSNT: CPT | Mod: ,,, | Performed by: UROLOGY

## 2017-08-23 PROCEDURE — 99214 OFFICE O/P EST MOD 30 MIN: CPT | Mod: S$PBB,,, | Performed by: UROLOGY

## 2017-08-23 PROCEDURE — 84153 ASSAY OF PSA TOTAL: CPT

## 2017-08-23 PROCEDURE — 3077F SYST BP >= 140 MM HG: CPT | Mod: ,,, | Performed by: UROLOGY

## 2017-08-23 RX ORDER — TAMSULOSIN HYDROCHLORIDE 0.4 MG/1
0.4 CAPSULE ORAL DAILY
Qty: 30 CAPSULE | Refills: 11 | Status: SHIPPED | OUTPATIENT
Start: 2017-08-23 | End: 2017-08-23 | Stop reason: SDUPTHER

## 2017-08-23 RX ORDER — TAMSULOSIN HYDROCHLORIDE 0.4 MG/1
0.4 CAPSULE ORAL DAILY
Qty: 30 CAPSULE | Refills: 11 | Status: SHIPPED | OUTPATIENT
Start: 2017-08-23 | End: 2018-02-26 | Stop reason: SDUPTHER

## 2017-08-23 NOTE — PROGRESS NOTES
"Chief Complaint: LUTS    HPI:   8/23/17: No microhematuria observed at last visit.  No LUTS but mild retention.  8/23/16: Pt had some frequency and is taking a OTC supplement and it made things better; he hasn't taken it in three weeks but frequency is still better.  Is a heart transplant pt and this appt was made when he discussed his LUTS with the transplant service.  Overall feeling fine no problems to speak of.  Had a CT 6/16 that shows some renal cysts no other sig findings. Nocturia x2.  3/15: Elva: "65-year-old male returns to the clinic today for follow-up on his urinary infection.  Since his last visit when we had given him the antibiotics, his symptoms have cleared.  He is not having any urinary problems at present.  He still has nocturia ×1, but is otherwise doing very well. His urine culture did not show any growth, but his symptoms have cleared and his urinalysis today is also normal.  There was a small amount of blood on the dipstick, but only an occasional RBC under the microscope, and no leukocytes or nitrites."    Allergies:  Shellfish containing products    Medications:  has a current medication list which includes the following prescription(s): amlodipine, aspirin, atorvastatin, clopidogrel, ezetimibe, fluorouracil, folic acid-vit b6-vit b12 2.5-25-2 mg, furosemide, hydralazine, omega-3 acid ethyl esters, prednisone, ranitidine, tacrolimus, zetia, and zolpidem.    Review of Systems:  General: No fever, chills, fatigability, or weight loss.  Skin: No rashes, itching, or changes in color or texture of skin.  Chest: Denies ALCANTAR, cyanosis, wheezing, cough, and sputum production.  Abdomen: Appetite fine. No weight loss. Denies diarrhea, abdominal pain, hematemesis, or blood in stool.  Musculoskeletal: No joint stiffness or swelling. Denies back pain.  : As above.  All other review of systems negative.    PMH:   has a past medical history of CHF (congestive heart failure); Coronary artery disease; " Heart attack; Hyperlipidemia; Hypertension; Osteoarthritis of hip (7/25/2016); Squamous cell carcinoma (4/2015); and Tubular adenoma of colon (10/10/2012).    PSH:   has a past surgical history that includes Heart transplant; Hemorrhoid surgery; Parathyroid gland surgery; Tonsillectomy; Cardiac catheterization; Colonoscopy (N/A, 9/2/2016); Skin biopsy; and Heart transplant.    FamHx: family history includes Cancer in his sister; Heart attack in his brother, father, maternal grandfather, maternal grandmother, mother, and sister; Heart failure in his brother, father, maternal grandfather, maternal grandmother, and sister; Hypertension in his father and mother.    SocHx:  reports that he quit smoking about 12 months ago. His smoking use included Pipe. He has never used smokeless tobacco. He reports that he drinks alcohol. He reports that he does not use drugs.      Physical Exam:  There were no vitals filed for this visit.  General: A&Ox3, no apparent distress, no deformities  Neck: No masses, normal thyroid  Lungs: normal inspiration, no use of accessory muscles  Heart: normal pulse, no arrhythmias  Abdomen: Soft, NT, ND  Skin: The skin is warm and dry. No jaundice.  Ext: No c/c/e.  : Test desc kenn, no abnormalities of epididymus. Penis normal, with normal penile and scrotal skin. Meatus normal. Normal rectal tone, no hemorrhoids. Prost 40 gm no nodules or masses appreciated. SV not palpable. Perineum and anus normal.    Labs/Studies:   Urinalysis performed in clinic, summary: UA normal  PSA    6/16: 2.0  Bladder Scan performed in office:  ml.    Impression/Plan:   1. PSA now and RTC 6 mo to check on LUTS/PVR.

## 2017-09-04 ENCOUNTER — PATIENT MESSAGE (OUTPATIENT)
Dept: CARDIOLOGY | Facility: CLINIC | Age: 67
End: 2017-09-04

## 2017-09-06 ENCOUNTER — TELEPHONE (OUTPATIENT)
Dept: CARDIOLOGY | Facility: CLINIC | Age: 67
End: 2017-09-06

## 2017-09-06 ENCOUNTER — TELEPHONE (OUTPATIENT)
Dept: TRANSPLANT | Facility: CLINIC | Age: 67
End: 2017-09-06

## 2017-09-06 DIAGNOSIS — Z79.899 ENCOUNTER FOR LONG-TERM (CURRENT) USE OF MEDICATIONS: ICD-10-CM

## 2017-09-06 DIAGNOSIS — T86.290 CARDIAC ALLOGRAFT VASCULOPATHY: ICD-10-CM

## 2017-09-06 DIAGNOSIS — N28.9 RENAL INSUFFICIENCY: ICD-10-CM

## 2017-09-06 DIAGNOSIS — Z79.52 LONG TERM CURRENT USE OF SYSTEMIC STEROIDS: ICD-10-CM

## 2017-09-06 DIAGNOSIS — E78.2 MIXED HYPERLIPIDEMIA: ICD-10-CM

## 2017-09-06 DIAGNOSIS — Z94.1 STATUS POST HEART TRANSPLANT: ICD-10-CM

## 2017-09-06 DIAGNOSIS — I15.9 SECONDARY HYPERTENSION: ICD-10-CM

## 2017-09-06 DIAGNOSIS — R06.02 SHORTNESS OF BREATH: ICD-10-CM

## 2017-09-06 NOTE — TELEPHONE ENCOUNTER
Patient called stating that he has edema bilaterally in his legs below the knee. States it started 3 days ago after a car trip to visit his brother in [place].Denies sob or any other symptoms. States it is beginning to get better. Per Dr. Nguyen, probably dependent edema from car ride. Instructed patient to increase water intake, elevate legs as much as possible and call back in 2 days with progress report. Patient verbalized understanding. All questions answered. HTS notified of patient symptoms.

## 2017-09-06 NOTE — TELEPHONE ENCOUNTER
----- Message from Danay Garrett RN sent at 9/6/2017  8:33 AM CDT -----  Dilcia,   See my note in Epic. Mr. Hernandez called me complaining of lower extremity edema after long car trip to [place]. States it is beginning to resolve. Can you follow up on this? Thanks. Flaquita

## 2017-09-18 ENCOUNTER — PATIENT MESSAGE (OUTPATIENT)
Dept: TRANSPLANT | Facility: CLINIC | Age: 67
End: 2017-09-18

## 2017-09-18 DIAGNOSIS — Z94.1 HEART REPLACED BY TRANSPLANT: Primary | ICD-10-CM

## 2017-09-22 ENCOUNTER — OFFICE VISIT (OUTPATIENT)
Dept: TRANSPLANT | Facility: CLINIC | Age: 67
End: 2017-09-22
Payer: MEDICARE

## 2017-09-22 ENCOUNTER — HOSPITAL ENCOUNTER (OUTPATIENT)
Dept: CARDIOLOGY | Facility: CLINIC | Age: 67
Discharge: HOME | End: 2017-09-22
Payer: MEDICARE

## 2017-09-22 ENCOUNTER — TELEPHONE (OUTPATIENT)
Dept: TRANSPLANT | Facility: CLINIC | Age: 67
End: 2017-09-22

## 2017-09-22 ENCOUNTER — HOSPITAL ENCOUNTER (OUTPATIENT)
Dept: RADIOLOGY | Facility: HOSPITAL | Age: 67
Discharge: HOME | End: 2017-09-22
Attending: INTERNAL MEDICINE
Payer: MEDICARE

## 2017-09-22 VITALS
SYSTOLIC BLOOD PRESSURE: 160 MMHG | HEART RATE: 106 BPM | WEIGHT: 198 LBS | DIASTOLIC BLOOD PRESSURE: 84 MMHG | HEIGHT: 70 IN | BODY MASS INDEX: 28.35 KG/M2

## 2017-09-22 DIAGNOSIS — Z94.1 HEART REPLACED BY TRANSPLANT: ICD-10-CM

## 2017-09-22 DIAGNOSIS — I10 ESSENTIAL HYPERTENSION: ICD-10-CM

## 2017-09-22 DIAGNOSIS — E78.5 HYPERLIPIDEMIA, UNSPECIFIED HYPERLIPIDEMIA TYPE: ICD-10-CM

## 2017-09-22 DIAGNOSIS — Z94.1 HEART REPLACED BY TRANSPLANT: Primary | ICD-10-CM

## 2017-09-22 DIAGNOSIS — Z94.1 STATUS POST HEART TRANSPLANT: ICD-10-CM

## 2017-09-22 DIAGNOSIS — N18.30 BENIGN HYPERTENSIVE HEART AND KIDNEY DISEASE WITH CHF, NYHA CLASS 1 AND CKD STAGE 3: ICD-10-CM

## 2017-09-22 DIAGNOSIS — I25.811 CORONARY ARTERY DISEASE INVOLVING NATIVE ARTERY OF TRANSPLANTED HEART WITHOUT ANGINA PECTORIS: ICD-10-CM

## 2017-09-22 DIAGNOSIS — T86.290 CARDIAC ALLOGRAFT VASCULOPATHY: ICD-10-CM

## 2017-09-22 DIAGNOSIS — I13.0 BENIGN HYPERTENSIVE HEART AND KIDNEY DISEASE WITH CHF, NYHA CLASS 1 AND CKD STAGE 3: ICD-10-CM

## 2017-09-22 DIAGNOSIS — I25.10 CORONARY ARTERY DISEASE, ANGINA PRESENCE UNSPECIFIED, UNSPECIFIED VESSEL OR LESION TYPE, UNSPECIFIED WHETHER NATIVE OR TRANSPLANTED HEART: ICD-10-CM

## 2017-09-22 LAB
DIASTOLIC DYSFUNCTION: NO
ESTIMATED PA SYSTOLIC PRESSURE: 27.8
MITRAL VALVE MOBILITY: NORMAL
MITRAL VALVE REGURGITATION: NORMAL
RETIRED EF AND QEF - SEE NOTES: 60 (ref 55–65)
TRICUSPID VALVE REGURGITATION: NORMAL

## 2017-09-22 PROCEDURE — 71020 XR CHEST PA AND LATERAL: CPT | Mod: 26,,, | Performed by: RADIOLOGY

## 2017-09-22 PROCEDURE — 99999 PR PBB SHADOW E&M-EST. PATIENT-LVL III: CPT | Mod: PBBFAC,,, | Performed by: INTERNAL MEDICINE

## 2017-09-22 PROCEDURE — 99214 OFFICE O/P EST MOD 30 MIN: CPT | Mod: S$PBB,,, | Performed by: INTERNAL MEDICINE

## 2017-09-22 PROCEDURE — 93306 TTE W/DOPPLER COMPLETE: CPT | Mod: PBBFAC,NTX | Performed by: INTERNAL MEDICINE

## 2017-09-22 PROCEDURE — 71020 XR CHEST PA AND LATERAL: CPT | Mod: TC

## 2017-09-22 PROCEDURE — 86833 HLA CLASS II HIGH DEFIN QUAL: CPT | Mod: 91,PO,TXP

## 2017-09-22 PROCEDURE — 99213 OFFICE O/P EST LOW 20 MIN: CPT | Mod: PBBFAC,25 | Performed by: INTERNAL MEDICINE

## 2017-09-22 NOTE — LETTER
September 25, 2017        Iftikhar Houston  [address] [facility] [address]  Phone: [phone]  Fax: [phone]             [facility] [address] [place] [address]  Phone: [phone]   Patient: Jesse Hernandez Jr.   MR Number: [id number]   YOB: 1950   Date of Visit: 9/22/2017       Dear Dr. Iftikhar Houston    Thank you for referring Jesse Hernandez to me for evaluation. Attached you will find relevant portions of my assessment and plan of care.    If you have questions, please do not hesitate to call me. I look forward to following Jesse Hernandez along with you.    Sincerely,    Chitra Painting MD    Enclosure    If you would like to receive this communication electronically, please contact [email or web address] or [phone] to request [employer] access.    [employer] Link is a tool which provides read-only access to select patient information with whom you have a relationship. Its easy to use and provides real time access to review your patients record including encounter summaries, notes, results, and demographic information.    If you feel you have received this communication in error or would no longer like to receive these types of communications, please e-mail [email or web address]

## 2017-09-22 NOTE — TELEPHONE ENCOUNTER
Met with pt in clinic and reviewed medications. Pt took Tacrolimus before his lab work. Reviewed his A1c and a sugar clinic.

## 2017-09-30 ENCOUNTER — PATIENT MESSAGE (OUTPATIENT)
Dept: TRANSPLANT | Facility: CLINIC | Age: 67
End: 2017-09-30

## 2017-10-02 ENCOUNTER — PATIENT MESSAGE (OUTPATIENT)
Dept: TRANSPLANT | Facility: CLINIC | Age: 67
End: 2017-10-02

## 2017-10-02 NOTE — PROGRESS NOTES
Subjective:    Patient ID:  Jesse Hernandez Jr. is a 67 y.o. male who presents for follow-up of Heart Transplant Follow-up      HPI  Mr. Hernandez is a 68 yo WM s/p OHTx 9/03, with significant CAV, HTN, stage II-III CKD, HLP and long h/o asymptomatic leukocytosis (followed by Heme/Onc previously), who presents for his 13th post-transplant annual evaluation. Patient has not been seen since 2013, at which time he had demonstrated progression of his CAV. 11/14 LHC demonstrated mid RCA , mid LCX 75%, luminal irregularities everywhere. The LCMX at the time was unchanged from 11/13, however the  of RCA was a 50% lesion by C 11/13. Patient had signficiant fatigue, yard work problems last year preceding his LHC, which is what prompted us to send him to Dr. Nguyen again. Since undergoing intervention on his RCA (PCI prox RCA, mid RCA, distal RCA with MADELINE x 3) on 01/13/17, had another [facility] where he underwent intervention on his distal RCA  with MADELINE and mid RCA  with MADELINE, felt his mid LAD stenosis was 75% and unchanged and his 80% prox LCMX was unchanged as well from previous angiogram.    Since then, patient has been feeling great, is here for his 14th post transplant annual evaluation. Up to date on his health maintenance, not very active, walks with cane, however tries to be more active, has a new grandchild on the way. Denies chest pain, chest pressure, N/V/F/C, lightheadedness, dizziness, PNd, orthopnea, LE edema.       TTE 09/22/17:    1 - Post-cardiac transplantation study.     2 - Normal left ventricular systolic function (EF 60-65%).     3 - Concentric remodeling.     4 - Wall motion abnormalities.     5 - Low normal to mildly depressed right ventricular systolic function .     6 - Trivial to mild mitral regurgitation.     7 - Mild to moderate tricuspid regurgitation.     8 - Trivial pulmonic regurgitation.     9 - The estimated PA systolic pressure is 28 mmHg.       Review of Systems   Constitution: Negative for  "chills, decreased appetite, diaphoresis, fever, weakness, malaise/fatigue, weight gain and weight loss.   HENT: Negative for congestion.    Eyes: Negative for visual disturbance.   Cardiovascular: Negative for chest pain, claudication, cyanosis, dyspnea on exertion, irregular heartbeat, leg swelling, near-syncope, orthopnea, palpitations, paroxysmal nocturnal dyspnea and syncope.   Respiratory: Negative for cough, hemoptysis, shortness of breath, sleep disturbances due to breathing, snoring (occasional), sputum production and wheezing.    Hematologic/Lymphatic: Negative for adenopathy and bleeding problem. Does not bruise/bleed easily.   Skin: Negative for color change, poor wound healing, rash, skin cancer and suspicious lesions.   Musculoskeletal: Positive for arthritis and joint pain. Negative for back pain, falls, gout and muscle weakness.   Gastrointestinal: Negative for bloating, abdominal pain, anorexia, constipation, diarrhea, hematemesis, hematochezia, melena, nausea and vomiting.   Genitourinary: Positive for hesitancy. Negative for nocturia.   Neurological: Negative for excessive daytime sleepiness, dizziness, focal weakness, headaches, light-headedness, paresthesias and tremors.   Psychiatric/Behavioral: Negative for depression and memory loss. The patient does not have insomnia and is not nervous/anxious.         Objective:      Physical Exam   Constitutional: He is oriented to person, place, and time. He appears well-developed and well-nourished.   BP (!) 160/84 (BP Location: Left arm, Patient Position: Sitting, BP Method: Large (Automatic))   Pulse 106   Ht 5' 10" (1.778 m)   Wt 89.8 kg (197 lb 15.6 oz)   BMI 28.41 kg/m²      HENT:   Head: Normocephalic and atraumatic.   Right Ear: External ear normal.   Left Ear: External ear normal.   Mouth/Throat: Oropharynx is clear and moist.   Eyes: Conjunctivae are normal. Pupils are equal, round, and reactive to light.   Neck: Normal range of motion. Neck " supple. No JVD present.   Cardiovascular: Regular rhythm, normal heart sounds and intact distal pulses.  Tachycardia present.    Pulmonary/Chest: Effort normal and breath sounds normal. No respiratory distress. He has no wheezes. He has no rales. He exhibits no tenderness.   Abdominal: Soft. Bowel sounds are normal.   Musculoskeletal: Normal range of motion. He exhibits no edema.   Neurological: He is alert and oriented to person, place, and time.   Skin: Skin is warm and dry.   Psychiatric: He has a normal mood and affect. His behavior is normal. Judgment and thought content normal.   Nursing note and vitals reviewed.      Chemistry        Component Value Date/Time     09/22/2017 0823     09/22/2017 0823    K 3.6 09/22/2017 0823    K 3.6 09/22/2017 0823     09/22/2017 0823     09/22/2017 0823    CO2 26 09/22/2017 0823    CO2 26 09/22/2017 0823    BUN 30 (H) 09/22/2017 0823    BUN 30 (H) 09/22/2017 0823    CREATININE 1.8 (H) 09/22/2017 0823    CREATININE 1.8 (H) 09/22/2017 0823     (H) 09/22/2017 0823     (H) 09/22/2017 0823        Component Value Date/Time    CALCIUM 9.3 09/22/2017 0823    CALCIUM 9.3 09/22/2017 0823    ALKPHOS 68 09/22/2017 0823    ALKPHOS 68 09/22/2017 0823    AST 23 09/22/2017 0823    AST 23 09/22/2017 0823    ALT 30 09/22/2017 0823    ALT 30 09/22/2017 0823    BILITOT 1.6 (H) 09/22/2017 0823    BILITOT 1.6 (H) 09/22/2017 0823            Magnesium   Date Value Ref Range Status   09/22/2017 1.7 1.6 - 2.6 mg/dL Final       Lab Results   Component Value Date    WBC 13.08 (H) 09/22/2017    HGB 15.6 09/22/2017    HCT 44.1 09/22/2017    MCV 86 09/22/2017     09/22/2017       Lab Results   Component Value Date    INR 0.9 11/13/2015    INR 1.0 01/23/2015    INR 1.0 11/21/2014       BNP   Date Value Ref Range Status   09/22/2017 80 0 - 99 pg/mL Final     Comment:     Values of less than 100 pg/ml are consistent with non-CHF populations.   05/19/2017 47 0 - 99  pg/mL Final     Comment:     Values of less than 100 pg/ml are consistent with non-CHF populations.   03/28/2017 56 0 - 99 pg/mL Final     Comment:     Values of less than 100 pg/ml are consistent with non-CHF populations.         Assessment:       1. Heart replaced by transplant    2. Hyperlipidemia, unspecified hyperlipidemia type    3. Essential hypertension    4. Coronary artery disease involving native artery of transplanted heart without angina pectoris    5. Coronary artery disease, angina presence unspecified, unspecified vessel or lesion type, unspecified whether native or transplanted heart    6. CKD (chronic kidney disease) stage 2, GFR 60-89 ml/min         Plan:       Patient doing very well, despite ongoing issues with CAV.   Recommend following up with his PCP regarding elevated sugars on fasting labs this week.   Recommend 2 gram sodium restriction and 2000cc fluid restriction.  Encourage physical activity with graded exercise program.  Requested patient to weigh themselves daily, and to notify us if their weight increases by more than 3 lbs in 1 day or 5 lbs in 1 wk.  Blood pressure elevated however patient did not take all of his meds today, will take his meds and follow his blood pressure, let us know what his pressures are running.

## 2017-10-16 ENCOUNTER — OFFICE VISIT (OUTPATIENT)
Dept: DERMATOLOGY | Facility: CLINIC | Age: 67
End: 2017-10-16
Payer: MEDICARE

## 2017-10-16 DIAGNOSIS — L57.0 ACTINIC KERATOSES: ICD-10-CM

## 2017-10-16 DIAGNOSIS — Z12.83 SCREENING, MALIGNANT NEOPLASM, SKIN: ICD-10-CM

## 2017-10-16 DIAGNOSIS — Z85.828 HISTORY OF SKIN CANCER: ICD-10-CM

## 2017-10-16 DIAGNOSIS — L90.5 SCAR CONDITIONS/SKIN FIBROSIS: Primary | ICD-10-CM

## 2017-10-16 DIAGNOSIS — L82.1 SEBORRHEIC KERATOSIS: ICD-10-CM

## 2017-10-16 PROCEDURE — 99212 OFFICE O/P EST SF 10 MIN: CPT | Mod: PBBFAC,PO | Performed by: DERMATOLOGY

## 2017-10-16 PROCEDURE — 99213 OFFICE O/P EST LOW 20 MIN: CPT | Mod: 25,S$PBB,, | Performed by: DERMATOLOGY

## 2017-10-16 PROCEDURE — 17003 DESTRUCT PREMALG LES 2-14: CPT | Mod: PBBFAC,PO | Performed by: DERMATOLOGY

## 2017-10-16 PROCEDURE — 99999 PR PBB SHADOW E&M-EST. PATIENT-LVL II: CPT | Mod: PBBFAC,,, | Performed by: DERMATOLOGY

## 2017-10-16 PROCEDURE — 17003 DESTRUCT PREMALG LES 2-14: CPT | Mod: S$PBB,,, | Performed by: DERMATOLOGY

## 2017-10-16 PROCEDURE — 17000 DESTRUCT PREMALG LESION: CPT | Mod: PBBFAC,PO | Performed by: DERMATOLOGY

## 2017-10-16 PROCEDURE — 17000 DESTRUCT PREMALG LESION: CPT | Mod: S$PBB,,, | Performed by: DERMATOLOGY

## 2017-10-16 RX ORDER — EZETIMIBE 10 MG/1
TABLET ORAL
Qty: 30 TABLET | Refills: 11 | Status: SHIPPED | OUTPATIENT
Start: 2017-10-16 | End: 2018-10-04 | Stop reason: SDUPTHER

## 2017-10-16 RX ORDER — FUROSEMIDE 20 MG/1
TABLET ORAL
Qty: 30 TABLET | Refills: 11 | Status: SHIPPED | OUTPATIENT
Start: 2017-10-16 | End: 2018-10-04 | Stop reason: SDUPTHER

## 2017-10-16 RX ORDER — CLOPIDOGREL BISULFATE 75 MG/1
TABLET ORAL
Qty: 30 TABLET | Refills: 11 | Status: SHIPPED | OUTPATIENT
Start: 2017-10-16 | End: 2018-10-04 | Stop reason: SDUPTHER

## 2017-10-16 RX ORDER — PREDNISONE 5 MG/1
TABLET ORAL
Qty: 30 TABLET | Refills: 11 | Status: SHIPPED | OUTPATIENT
Start: 2017-10-16 | End: 2018-10-04 | Stop reason: SDUPTHER

## 2017-10-16 RX ORDER — HYDRALAZINE HYDROCHLORIDE 25 MG/1
TABLET, FILM COATED ORAL
Qty: 90 TABLET | Refills: 11 | Status: SHIPPED | OUTPATIENT
Start: 2017-10-16 | End: 2018-10-04 | Stop reason: SDUPTHER

## 2017-10-16 NOTE — PATIENT INSTRUCTIONS

## 2017-10-16 NOTE — PROGRESS NOTES
Subjective:       Patient ID:  Jesse Hernandez Jr. is a 67 y.o. 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  Denies reaction to efudex. He states area has healed well.         Review of Systems   Constitutional: Negative for fever and chills.   Gastrointestinal: Negative for nausea and vomiting.   Skin: Negative for daily sunscreen use, activity-related sunscreen use and recent sunburn.   Hematologic/Lymphatic: Does not bruise/bleed easily.        Objective:    Physical Exam   Constitutional: He appears well-developed and well-nourished. No distress.   Neurological: He is alert and oriented to person, place, and time. He is not disoriented.   Psychiatric: He has a normal mood and affect.   Skin:   Areas Examined (abnormalities noted in diagram):   Scalp / Hair Palpated and Inspected  Head / Face Inspection Performed  Neck Inspection Performed  RUE Inspected  LUE Inspection Performed  Nails and Digits Inspection Performed                   Diagram Legend     Erythematous scaling macule/papule c/w actinic keratosis       Vascular papule c/w angioma      Pigmented verrucoid papule/plaque c/w seborrheic keratosis      Yellow umbilicated papule c/w sebaceous hyperplasia      Irregularly shaped tan macule c/w lentigo     1-2 mm smooth white papules consistent with Milia      Movable subcutaneous cyst with punctum c/w epidermal inclusion cyst      Subcutaneous movable cyst c/w pilar cyst      Firm pink to brown papule c/w dermatofibroma      Pedunculated fleshy papule(s) c/w skin tag(s)      Evenly pigmented macule c/w junctional nevus     Mildly variegated pigmented, slightly irregular-bordered macule  c/w mildly atypical nevus      Flesh colored to evenly pigmented papule c/w intradermal nevus       Pink pearly papule/plaque c/w basal cell carcinoma      Erythematous hyperkeratotic cursted plaque c/w SCC      Surgical scar with no sign of skin cancer recurrence      Open and closed comedones      Inflammatory papules and pustules      Verrucoid papule consistent consistent with wart     Erythematous eczematous patches and plaques     Dystrophic onycholytic nail with subungual debris c/w onychomycosis     Umbilicated papule    Erythematous-base heme-crusted tan verrucoid plaque consistent with inflamed seborrheic keratosis     Erythematous Silvery Scaling Plaque c/w Psoriasis     See annotation      Assessment / Plan:        Scar conditions/skin fibrosis  History of skin cancer  Screening, malignant neoplasm, skin  Scar of the left parietal scalp, right eyebrow, hx of NMSC.  No evidence of recurrence on physical exam today.  Continue routine skin surveillance. Daily sunscreen advised.    Seborrheic keratosis  Reassurance given. Discussed diagnosis and that lesions are benign.  AAD handout given.     Actinic keratoses  Cryosurgery Procedure Note    The patient is informed of the precancerous quality and need for treatment of these lesions. After risks, benefits and alternatives explained, including blistering, pain, hyper- and hypopigmentation, patient verbally consents to cryotherapy to precancerous lesions. Liquid nitrogen cryosurgery is applied to the 5 actinic keratoses, as detailed in the physical exam, to produce a freeze injury. The patient is aware that blisters may form and is instructed on wound care with gentle cleansing and use of vaseline ointment to keep moist until healed. The patient is supplied a handout on cryosurgery and is instructed to call if lesions do not completely resolve.             Return in about 6 months (around 4/16/2018).

## 2017-11-01 ENCOUNTER — TELEPHONE (OUTPATIENT)
Dept: TRANSPLANT | Facility: CLINIC | Age: 67
End: 2017-11-01

## 2017-11-09 RX ORDER — ATORVASTATIN CALCIUM 40 MG/1
TABLET, FILM COATED ORAL
Qty: 60 TABLET | Refills: 11 | Status: SHIPPED | OUTPATIENT
Start: 2017-11-09 | End: 2018-11-02 | Stop reason: SDUPTHER

## 2017-11-16 RX ORDER — TACROLIMUS 1 MG/1
CAPSULE ORAL
Qty: 450 CAPSULE | Refills: 0 | Status: SHIPPED | OUTPATIENT
Start: 2017-11-16 | End: 2018-05-02 | Stop reason: SDUPTHER

## 2017-12-04 ENCOUNTER — LAB VISIT (OUTPATIENT)
Dept: LAB | Facility: HOSPITAL | Age: 67
End: 2017-12-04
Attending: INTERNAL MEDICINE
Payer: MEDICARE

## 2017-12-04 DIAGNOSIS — Z94.1 STATUS POST HEART TRANSPLANT: ICD-10-CM

## 2017-12-04 DIAGNOSIS — T86.290 CARDIAC ALLOGRAFT VASCULOPATHY: ICD-10-CM

## 2017-12-04 DIAGNOSIS — Z94.1 HEART REPLACED BY TRANSPLANT: ICD-10-CM

## 2017-12-04 DIAGNOSIS — E78.2 MIXED HYPERLIPIDEMIA: ICD-10-CM

## 2017-12-04 DIAGNOSIS — Z79.899 ENCOUNTER FOR LONG-TERM (CURRENT) USE OF MEDICATIONS: ICD-10-CM

## 2017-12-04 LAB
ALBUMIN SERPL BCP-MCNC: 3.7 G/DL
ALP SERPL-CCNC: 56 U/L
ALT SERPL W/O P-5'-P-CCNC: 34 U/L
ANION GAP SERPL CALC-SCNC: 10 MMOL/L
AST SERPL-CCNC: 23 U/L
BASOPHILS # BLD AUTO: 0.03 K/UL
BASOPHILS NFR BLD: 0.3 %
BILIRUB SERPL-MCNC: 1.1 MG/DL
BNP SERPL-MCNC: 54 PG/ML
BUN SERPL-MCNC: 20 MG/DL
CALCIUM SERPL-MCNC: 9.4 MG/DL
CHLORIDE SERPL-SCNC: 105 MMOL/L
CHOLEST SERPL-MCNC: 116 MG/DL
CHOLEST/HDLC SERPL: 3 {RATIO}
CO2 SERPL-SCNC: 27 MMOL/L
CREAT SERPL-MCNC: 1.4 MG/DL
DIFFERENTIAL METHOD: ABNORMAL
EOSINOPHIL # BLD AUTO: 0.4 K/UL
EOSINOPHIL NFR BLD: 3.5 %
ERYTHROCYTE [DISTWIDTH] IN BLOOD BY AUTOMATED COUNT: 14.2 %
EST. GFR  (AFRICAN AMERICAN): 58.8 ML/MIN/1.73 M^2
EST. GFR  (NON AFRICAN AMERICAN): 50.9 ML/MIN/1.73 M^2
ESTIMATED AVG GLUCOSE: 143 MG/DL
GLUCOSE SERPL-MCNC: 130 MG/DL
HBA1C MFR BLD HPLC: 6.6 %
HCT VFR BLD AUTO: 45.8 %
HDLC SERPL-MCNC: 39 MG/DL
HDLC SERPL: 33.6 %
HGB BLD-MCNC: 15.1 G/DL
IMM GRANULOCYTES # BLD AUTO: 0.05 K/UL
IMM GRANULOCYTES NFR BLD AUTO: 0.5 %
LDLC SERPL CALC-MCNC: 38.2 MG/DL
LYMPHOCYTES # BLD AUTO: 2.4 K/UL
LYMPHOCYTES NFR BLD: 22.6 %
MAGNESIUM SERPL-MCNC: 1.3 MG/DL
MCH RBC QN AUTO: 30.3 PG
MCHC RBC AUTO-ENTMCNC: 33 G/DL
MCV RBC AUTO: 92 FL
MONOCYTES # BLD AUTO: 1.4 K/UL
MONOCYTES NFR BLD: 13 %
NEUTROPHILS # BLD AUTO: 6.4 K/UL
NEUTROPHILS NFR BLD: 60.1 %
NONHDLC SERPL-MCNC: 77 MG/DL
NRBC BLD-RTO: 0 /100 WBC
PLATELET # BLD AUTO: 326 K/UL
PMV BLD AUTO: 9.7 FL
POTASSIUM SERPL-SCNC: 4.5 MMOL/L
PROT SERPL-MCNC: 7.1 G/DL
RBC # BLD AUTO: 4.99 M/UL
SODIUM SERPL-SCNC: 142 MMOL/L
TRIGL SERPL-MCNC: 194 MG/DL
WBC # BLD AUTO: 10.63 K/UL

## 2017-12-04 PROCEDURE — 83036 HEMOGLOBIN GLYCOSYLATED A1C: CPT

## 2017-12-04 PROCEDURE — 85025 COMPLETE CBC W/AUTO DIFF WBC: CPT

## 2017-12-04 PROCEDURE — 80197 ASSAY OF TACROLIMUS: CPT

## 2017-12-04 PROCEDURE — 36415 COLL VENOUS BLD VENIPUNCTURE: CPT | Mod: PO

## 2017-12-04 PROCEDURE — 83735 ASSAY OF MAGNESIUM: CPT

## 2017-12-04 PROCEDURE — 83880 ASSAY OF NATRIURETIC PEPTIDE: CPT

## 2017-12-04 PROCEDURE — 80053 COMPREHEN METABOLIC PANEL: CPT

## 2017-12-04 PROCEDURE — 80061 LIPID PANEL: CPT

## 2017-12-05 ENCOUNTER — TELEPHONE (OUTPATIENT)
Dept: TRANSPLANT | Facility: CLINIC | Age: 67
End: 2017-12-05

## 2017-12-05 ENCOUNTER — PATIENT MESSAGE (OUTPATIENT)
Dept: TRANSPLANT | Facility: CLINIC | Age: 67
End: 2017-12-05

## 2017-12-05 LAB — TACROLIMUS BLD-MCNC: 9.3 NG/ML

## 2017-12-05 NOTE — TELEPHONE ENCOUNTER
[facility] routine lab results. His BNP was low and normal along with CBC and CMP. Magnesium was low so I encouraged supplement through Pill or diet. His lipid panel not normal but much improved.

## 2018-01-02 ENCOUNTER — TELEPHONE (OUTPATIENT)
Dept: TRANSPLANT | Facility: CLINIC | Age: 68
End: 2018-01-02

## 2018-01-02 NOTE — TELEPHONE ENCOUNTER
Spoke to pt regarding his clinical status. Had a good holiday. Reviewed his lab from early December with him. Cr. Improved to 1.4. Prograf level therapeutic at 9.3. I told him that I would get back to him regarding whether he needs another [facility] this month as it has been a year. He has been following with a local MD for his DM. A1C was 6.6. He does not check his blood sugars; does not have equipment to do so.

## 2018-02-19 ENCOUNTER — LAB VISIT (OUTPATIENT)
Dept: LAB | Facility: HOSPITAL | Age: 68
End: 2018-02-19
Attending: INTERNAL MEDICINE
Payer: MEDICARE

## 2018-02-19 DIAGNOSIS — T86.290 CARDIAC ALLOGRAFT VASCULOPATHY: ICD-10-CM

## 2018-02-19 DIAGNOSIS — Z94.1 STATUS POST HEART TRANSPLANT: ICD-10-CM

## 2018-02-19 DIAGNOSIS — E78.2 MIXED HYPERLIPIDEMIA: ICD-10-CM

## 2018-02-19 DIAGNOSIS — Z94.1 HEART REPLACED BY TRANSPLANT: ICD-10-CM

## 2018-02-19 DIAGNOSIS — Z79.899 ENCOUNTER FOR LONG-TERM (CURRENT) USE OF MEDICATIONS: ICD-10-CM

## 2018-02-19 LAB
ALBUMIN SERPL BCP-MCNC: 3.6 G/DL
ALP SERPL-CCNC: 62 U/L
ALT SERPL W/O P-5'-P-CCNC: 41 U/L
ANION GAP SERPL CALC-SCNC: 14 MMOL/L
AST SERPL-CCNC: 25 U/L
BASOPHILS # BLD AUTO: 0.04 K/UL
BASOPHILS NFR BLD: 0.4 %
BILIRUB SERPL-MCNC: 1.3 MG/DL
BNP SERPL-MCNC: 48 PG/ML
BUN SERPL-MCNC: 22 MG/DL
CALCIUM SERPL-MCNC: 9.1 MG/DL
CHLORIDE SERPL-SCNC: 105 MMOL/L
CHOLEST SERPL-MCNC: 128 MG/DL
CHOLEST/HDLC SERPL: 2.8 {RATIO}
CO2 SERPL-SCNC: 26 MMOL/L
CREAT SERPL-MCNC: 1.3 MG/DL
DIFFERENTIAL METHOD: ABNORMAL
EOSINOPHIL # BLD AUTO: 0.3 K/UL
EOSINOPHIL NFR BLD: 2.7 %
ERYTHROCYTE [DISTWIDTH] IN BLOOD BY AUTOMATED COUNT: 14.6 %
EST. GFR  (AFRICAN AMERICAN): >60 ML/MIN/1.73 M^2
EST. GFR  (NON AFRICAN AMERICAN): 55.3 ML/MIN/1.73 M^2
GLUCOSE SERPL-MCNC: 114 MG/DL
HCT VFR BLD AUTO: 46.5 %
HDLC SERPL-MCNC: 45 MG/DL
HDLC SERPL: 35.2 %
HGB BLD-MCNC: 15 G/DL
IMM GRANULOCYTES # BLD AUTO: 0.06 K/UL
IMM GRANULOCYTES NFR BLD AUTO: 0.5 %
LDLC SERPL CALC-MCNC: 37 MG/DL
LYMPHOCYTES # BLD AUTO: 2.6 K/UL
LYMPHOCYTES NFR BLD: 23.5 %
MAGNESIUM SERPL-MCNC: 1.5 MG/DL
MCH RBC QN AUTO: 30.1 PG
MCHC RBC AUTO-ENTMCNC: 32.3 G/DL
MCV RBC AUTO: 93 FL
MONOCYTES # BLD AUTO: 1.3 K/UL
MONOCYTES NFR BLD: 11.8 %
NEUTROPHILS # BLD AUTO: 6.7 K/UL
NEUTROPHILS NFR BLD: 61.1 %
NONHDLC SERPL-MCNC: 83 MG/DL
NRBC BLD-RTO: 0 /100 WBC
PLATELET # BLD AUTO: 311 K/UL
PMV BLD AUTO: 9.9 FL
POTASSIUM SERPL-SCNC: 4.1 MMOL/L
PROT SERPL-MCNC: 7 G/DL
RBC # BLD AUTO: 4.99 M/UL
SODIUM SERPL-SCNC: 145 MMOL/L
TRIGL SERPL-MCNC: 230 MG/DL
WBC # BLD AUTO: 10.97 K/UL

## 2018-02-19 PROCEDURE — 86833 HLA CLASS II HIGH DEFIN QUAL: CPT | Mod: 91

## 2018-02-19 PROCEDURE — 85025 COMPLETE CBC W/AUTO DIFF WBC: CPT

## 2018-02-19 PROCEDURE — 80197 ASSAY OF TACROLIMUS: CPT

## 2018-02-19 PROCEDURE — 86833 HLA CLASS II HIGH DEFIN QUAL: CPT

## 2018-02-19 PROCEDURE — 80061 LIPID PANEL: CPT

## 2018-02-19 PROCEDURE — 86832 HLA CLASS I HIGH DEFIN QUAL: CPT | Mod: 91

## 2018-02-19 PROCEDURE — 86832 HLA CLASS I HIGH DEFIN QUAL: CPT

## 2018-02-19 PROCEDURE — 80053 COMPREHEN METABOLIC PANEL: CPT

## 2018-02-19 PROCEDURE — 83735 ASSAY OF MAGNESIUM: CPT

## 2018-02-19 PROCEDURE — 36415 COLL VENOUS BLD VENIPUNCTURE: CPT | Mod: PO

## 2018-02-19 PROCEDURE — 83880 ASSAY OF NATRIURETIC PEPTIDE: CPT

## 2018-02-19 PROCEDURE — 86977 RBC SERUM PRETX INCUBJ/INHIB: CPT | Mod: 91

## 2018-02-20 ENCOUNTER — TELEPHONE (OUTPATIENT)
Dept: TRANSPLANT | Facility: CLINIC | Age: 68
End: 2018-02-20

## 2018-02-20 LAB — TACROLIMUS BLD-MCNC: 9.4 NG/ML

## 2018-02-20 NOTE — TELEPHONE ENCOUNTER
Left message after chart review. No changes in meds at this time as he is getting [facility] Friday.

## 2018-02-20 NOTE — TELEPHONE ENCOUNTER
Chart Review         2/3 Prograf   Pred 5     Prograf level 9.4 ( Range 6-8)   Creat 1.3     Trig : 230       Plan :   Angio in near future

## 2018-02-21 LAB
CLASS I ANTIBODY COMMENTS - LUMINEX: NORMAL
CLASS II ANTIBODIES - LUMINEX: NORMAL
CLASS II ANTIBODY COMMENTS - LUMINEX: NORMAL
DSA1 TESTING DATE: NORMAL
DSA12 TESTING DATE: NORMAL
DSA2 TESTING DATE: NORMAL
SERUM COLLECTION DT - LUMINEX CLASS I: NORMAL
SERUM COLLECTION DT - LUMINEX CLASS II: NORMAL

## 2018-02-22 ENCOUNTER — DOCUMENTATION ONLY (OUTPATIENT)
Dept: CARDIOLOGY | Facility: CLINIC | Age: 68
End: 2018-02-22

## 2018-02-22 ENCOUNTER — OFFICE VISIT (OUTPATIENT)
Dept: CARDIOLOGY | Facility: CLINIC | Age: 68
End: 2018-02-22
Payer: MEDICARE

## 2018-02-22 VITALS
BODY MASS INDEX: 28.72 KG/M2 | HEIGHT: 70 IN | OXYGEN SATURATION: 96 % | DIASTOLIC BLOOD PRESSURE: 85 MMHG | SYSTOLIC BLOOD PRESSURE: 131 MMHG | WEIGHT: 200.63 LBS | HEART RATE: 108 BPM

## 2018-02-22 DIAGNOSIS — I25.811 CORONARY ARTERY DISEASE INVOLVING NATIVE ARTERY OF TRANSPLANTED HEART WITHOUT ANGINA PECTORIS: ICD-10-CM

## 2018-02-22 DIAGNOSIS — E78.2 MIXED HYPERLIPIDEMIA: ICD-10-CM

## 2018-02-22 DIAGNOSIS — Z94.1 STATUS POST HEART TRANSPLANTATION: Primary | ICD-10-CM

## 2018-02-22 DIAGNOSIS — Z94.1 TRANSPLANTED HEART: ICD-10-CM

## 2018-02-22 DIAGNOSIS — I99.9 VASCULOPATHY: ICD-10-CM

## 2018-02-22 DIAGNOSIS — I10 ESSENTIAL HYPERTENSION: ICD-10-CM

## 2018-02-22 PROCEDURE — 99999 PR PBB SHADOW E&M-EST. PATIENT-LVL IV: CPT | Mod: PBBFAC,,, | Performed by: INTERNAL MEDICINE

## 2018-02-22 PROCEDURE — 99213 OFFICE O/P EST LOW 20 MIN: CPT | Mod: S$PBB,,, | Performed by: INTERNAL MEDICINE

## 2018-02-22 PROCEDURE — 99214 OFFICE O/P EST MOD 30 MIN: CPT | Mod: PBBFAC | Performed by: INTERNAL MEDICINE

## 2018-02-22 PROCEDURE — 1159F MED LIST DOCD IN RCRD: CPT | Mod: ,,, | Performed by: INTERNAL MEDICINE

## 2018-02-22 PROCEDURE — 1126F AMNT PAIN NOTED NONE PRSNT: CPT | Mod: ,,, | Performed by: INTERNAL MEDICINE

## 2018-02-22 RX ORDER — DIPHENHYDRAMINE HCL 25 MG
50 CAPSULE ORAL ONCE
Status: CANCELLED | OUTPATIENT
Start: 2018-02-22 | End: 2018-02-22

## 2018-02-22 RX ORDER — METFORMIN HYDROCHLORIDE 500 MG/1
TABLET, EXTENDED RELEASE ORAL 2 TIMES DAILY WITH MEALS
COMMUNITY
Start: 2018-02-14

## 2018-02-22 RX ORDER — SODIUM CHLORIDE 9 MG/ML
3 INJECTION, SOLUTION INTRAVENOUS CONTINUOUS
Status: CANCELLED | OUTPATIENT
Start: 2018-02-22 | End: 2018-02-22

## 2018-02-22 NOTE — PROGRESS NOTES
Cardiology Clinic Note  Reason for Visit: Surveillance LHC    HPI:   It was my pleasure today to meet Mr Hernandez, he is a pleasant 68 y.o male here today for a surveillance LHC after heart transplant.    Patient mentioned he has no chest pain but he has some SOB after walking about 2 blocks. In addition to bilateral lower extremities swelling at least once a week. He denies orthopnea or PND.  He denies palpitations, syncope, or near syncope. He is compliant with diet and medications and otherwise he feels well. He denied any fever and no chills and denied all other complains. He mentioned he doesn't check his BP frequently at home but when he checks its mostly <130 systolic.     He has PMH of OHTx 9/03, with significant CAV, HTN, stage II-III CKD, HLP. On 11/14 LHC demonstrated mid RCA , mid LCX 75%, luminal irregularities everywhere. The LCMX at the time was unchanged from 11/13, however the  of RCA was a 50% lesion by LHC 11/13. Patient had signficiant fatigue, yard work problems last year preceding his LHC, which is what prompted us to send him to Dr. Nguyen again. Since undergoing intervention on his RCA (PCI prox RCA, mid RCA, distal RCA with MADELINE x 3) on 01/13/17, had another LHC where he underwent intervention on his distal RCA  with MADELINE and mid RCA  with MADELINE, felt his mid LAD stenosis was 75% and unchanged and his 80% prox LCMX was unchanged as well from previous angiogram.       ROS:    Constitution: Negative for fever or chills. Negative for weight loss or gain.   HENT: Negative for sore throat or headaches. Negative for rhinorrhea.  Eyes: Negative for blurred or double vision.   Cardiovascular: See above  Pulmonary: Negative for SOB. Negative for cough.   Gastrointestinal: Negative for abdominal pain. Negative for nausea/ vomiting. Negative for diarrhea.   : Negative for dysuria.   Skin: Negative for rashes.  Neurological: Negative for focal weakness or sensory changes.  Psychological:  Negative for depression or anxiety.  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    Tubular adenoma of colon 10/10/2012     Past Surgical History:   Procedure Laterality Date    CARDIAC CATHETERIZATION      COLONOSCOPY N/A 9/2/2016    Procedure: COLONOSCOPY;  Surgeon: Stef Suazo MD;  Location: [facility];  Service: Endoscopy;  Laterality: N/A;    HEART TRANSPLANT      HEART TRANSPLANT      HEMORRHOID SURGERY      PARATHYROID GLAND SURGERY      SKIN BIOPSY      TONSILLECTOMY       Allergies:     Review of patient's allergies indicates:   Allergen Reactions    Shellfish containing products      Soft shell crab only     Medications:     Current Outpatient Prescriptions on File Prior to Visit   Medication Sig Dispense Refill    amlodipine (NORVASC) 5 MG tablet TAKE 2 TABLETS EVERY MORNING AND 1 TABLET EVERY EVENING DAILY 90 tablet 11    aspirin (ECOTRIN) 81 MG EC tablet Take 81 mg by mouth every evening.       atorvastatin (LIPITOR) 40 MG tablet TAKE (2) TABLETS DAILY. 60 tablet 11    clopidogrel (PLAVIX) 75 mg tablet TAKE ONE TABLET DAILY 30 tablet 11    folic acid-vit B6-vit B12 (FOLTX) 2.5-25-2 mg tablet Take 1 tablet by mouth once daily.        furosemide (LASIX) 20 MG tablet TAKE (1) TABLET DAILY. 30 tablet 11    hydrALAZINE (APRESOLINE) 25 MG tablet TAKE  (1)  TABLET  THREE TIMES DAILY. 90 tablet 11    omega-3 acid ethyl esters (LOVAZA) 1 gram capsule Take 2 capsules (2 g total) by mouth 2 (two) times daily. (Patient taking differently: Take 2 g by mouth every evening. ) 360 capsule 3    predniSONE (DELTASONE) 5 MG tablet TAKE (1) TABLET DAILY. 30 tablet 11    ranitidine (ZANTAC) 150 MG tablet Take 1 tablet (150 mg total) by mouth once daily. 90 tablet 3    tacrolimus  "(PROGRAF) 1 MG Cap TAKE 2 CAPSULES BY MOUTH IN THE MORNING AND TAKE 3 CAPSULES IN THE EVENING 450 capsule 0    tamsulosin (FLOMAX) 0.4 mg Cp24 Take 1 capsule (0.4 mg total) by mouth once daily. 30 capsule 11    ezetimibe (ZETIA) 10 mg tablet TAKE 1 TABLET ONCE DAILY IN THE EVENING 30 tablet 11    fluorouracil (EFUDEX) 5 % cream AAA bid x 4 weeks to biopsy site of scalp 40 g 1    ZETIA 10 mg tablet TAKE 1 TABLET ONCE DAILY IN THE EVENING 30 tablet 11     No current facility-administered medications on file prior to visit.      Social History:     Social History   Substance Use Topics    Smoking status: Former Smoker     Types: Pipe     Quit date: 8/1/2016    Smokeless tobacco: Never Used    Alcohol use 0.0 oz/week      Comment: rare alcohol use     Family History:     Family History   Problem Relation Age of Onset    Hypertension Mother     Heart attack Mother     Hypertension Father     Heart attack Father     Heart failure Father     Heart attack Sister     Heart attack Brother     Heart attack Maternal Grandmother     Heart failure Maternal Grandmother     Heart attack Maternal Grandfather     Heart failure Maternal Grandfather     Heart failure Sister     Heart failure Brother     Cancer Sister     Melanoma Neg Hx     Eczema Neg Hx     Lupus Neg Hx     Psoriasis Neg Hx      Physical Exam:     Vitals:    02/22/18 1024 02/22/18 1027   BP: 135/80 131/85   BP Location: Left arm Right arm   Patient Position: Sitting Sitting   BP Method: Large (Automatic) Large (Automatic)   Pulse: 110 108   SpO2: 96%    Weight: 91 kg (200 lb 9.9 oz)    Height: 5' 10" (1.778 m)        Constitutional: No apparent distress, conversant  HEENT: Sclera anicteric, extraocular movements intact  Neck: No jugular venous distension, no carotid bruits  CV: Regular rate and rhythm, no murmurs rubs or gallops, normal S1/S2  Pulm: Clear to auscultation bilaterally, no wheezes, rales, or ronchi  GI: Abdomen soft, nontender, " nondistended, normoactive bowel sounds  Extremities: No lower extremity edema, warm with palpable pulses  Skin: No ecchymosis, erythema, or ulcers  Psych: Alert and oriented to person place location, appropriate affect  Neuro: No focal deficits    Labs:     Lab Results   Component Value Date     02/19/2018    K 4.1 02/19/2018     02/19/2018    CO2 26 02/19/2018    BUN 22 02/19/2018    CREATININE 1.3 02/19/2018    ANIONGAP 14 02/19/2018     Lab Results   Component Value Date    AST 25 02/19/2018    ALT 41 02/19/2018    ALKPHOS 62 02/19/2018    BILITOT 1.3 (H) 02/19/2018    ALBUMIN 3.6 02/19/2018     Lab Results   Component Value Date    CALCIUM 9.1 02/19/2018    MG 1.5 (L) 02/19/2018     Lab Results   Component Value Date    BNP 48 02/19/2018    BNP 54 12/04/2017    BNP 80 09/22/2017    Lab Results   Component Value Date    WBC 10.97 02/19/2018    HGB 15.0 02/19/2018    HCT 46.5 02/19/2018     02/19/2018    GRAN 6.7 02/19/2018    GRAN 61.1 02/19/2018     Lab Results   Component Value Date    INR 0.9 11/13/2015     Lab Results   Component Value Date    CHOL 128 02/19/2018    HDL 45 02/19/2018    LDLCALC 37.0 (L) 02/19/2018    TRIG 230 (H) 02/19/2018     Lab Results   Component Value Date    HGBA1C 6.6 (H) 12/04/2017     Lab Results   Component Value Date    TSH 3.742 12/15/2016          Imaging:       EF   Date Value Ref Range Status   09/22/2017 60 55 - 65    01/12/2017 60 55 - 65    09/29/2016 60 55 - 65          Assessment:     1. Heart replaced by transplant    2. Coronary artery disease involving native artery of transplanted heart without angina pectoris    3. Vasculopathy    4. Transplanted heart    5. Essential hypertension    6. CKD (chronic kidney disease) stage 2, GFR 60-89 ml/min    7. Hyperlipidemia, unspecified hyperlipidemia type      Plan:   1. Heart transplant:  -He has some SOB after walking about 2 Blocks with bilateral lower extremities swelling at least once a week.  -Will  proceed with [facility].  -Bilateral Compression stocking ordered.   -Right radial access.  -5 Fr sheath.  -The risks, benefits & alternatives of the procedure were explained to the patient.   -The risks of coronary angiography include but are not limited to: Bleeding, infection, heart rhythm abnormalities, allergic reactions, kidney injury requiring dilaysis, stroke and death.   -The risks of moderate sedation include hypotension, respiratory depression, arrhythmias, bronchospasm, & death.   -Informed consent was obtained & the patient is agreeable to proceed with the procedure.    2.Hypertension:  -Well controlled we advised him to continue same medical regimen.     3.CKD:  -Last Cr 1.3; avoid nephrotix medications; oral hydration the day prior to cath and IV HYDRATION THE DAY OF CATH    4.Hyperlipidemia:  -continue statin and heart healthy diet    5. BILATERAL le edma. rec comprssion stockings; will provide prescription  Attending addendum to follow     Martita Durham MD  Cardiology Fellow  Pager: [phone]    I have personally taken the history and examined this patient and agree with the resident's note as stated above.  All of the patient's and his wife's questions were answered.

## 2018-02-22 NOTE — PROGRESS NOTES
OUTPATIENT CATHETERIZATION INSTRUCTIONS    You have been scheduled for a procedure in the catheterization lab on Friday, February 23, 2018.     Please report to the Cardiology Waiting Area on the Third floor of the hospital and check in at 6:30 AM.   You will then be taken to the SSCU (Short Stay Cardiac Unit) and prepared for your procedure. Please be aware that this is not the time of your procedure but the time you are to arrive. The procedures are scheduled on an hourly basis; however, emergency cases take precedence over all other cases.       You may not have anything to eat or drink after midnight the night before your test. You may take your regular morning medications with water. If there are any medications that you should not take you will be instructed to hold them that morning. If you are diabetic and on Metformin (Glucophage) do not take it the day before, the day of, and for 2 days after your procedure.      The procedure will take 1-2 hours to perform. After the procedure, you will return to SSCU on the third floor of the hospital. You will need to lie still (or keep your arm still) for the next 4 to 6 hours to minimize bleeding from the puncture site. Your family may remain in the room with you during this time.       You may be able to be discharged home that same afternoon if there is someone to drive you home and there were no complications. If you have one of the balloon, stent, or device procedures you may spend the night in the hospital. Your doctor will determine, based on your progress, the date and time of your discharge. The results of your procedure will be discussed with you before you are discharged. Any further testing or procedures will be scheduled for you either before you leave or you will be called with these appointments.       If you should have any questions, concerns, or need to change the date of your procedure, please call  WANDA Sams @ [phone]    Special  Instructions:  Hold Metformin today, tomorrow, Saturday and Sunday  Drink plenty of water today and after procedure    THE ABOVE INSTRUCTIONS WERE GIVEN TO THE PATIENT VERBALLY AND THEY VERBALIZED UNDERSTANDING.  THEY DO NOT REQUIRE ANY SPECIAL NEEDS AND DO NOT HAVE ANY LEARNING BARRIERS.          Directions for Reporting to Cardiology Waiting Area in the Hospital  If you park in the Parking Garage:  Take elevators to the1st floor of the parking garage.  Continue past the gift shop, coffee shop, and piano.  Take a right and go to the gold elevators. (Elevator B)  Take the elevator to the 3rd floor.  Follow the arrow on the sign on the wall that says Cath Lab Registration/EP Lab Registration.  Follow the long hallway all the way around until you come to a big open area.  This is the registration area.  Check in at Reception Desk.    OR    If family is dropping you off:  Have them drop you off at the front of the Hospital under the green overhang.  Enter through the doors and take a right.  Take the E elevators to the 3rd floor Cardiology Waiting Area.  Check in at the Reception Desk in the waiting room.

## 2018-02-23 ENCOUNTER — TELEPHONE (OUTPATIENT)
Dept: TRANSPLANT | Facility: CLINIC | Age: 68
End: 2018-02-23

## 2018-02-23 ENCOUNTER — SURGERY (OUTPATIENT)
Age: 68
End: 2018-02-23

## 2018-02-23 ENCOUNTER — HOSPITAL ENCOUNTER (OUTPATIENT)
Facility: HOSPITAL | Age: 68
Discharge: HOME | End: 2018-02-23
Attending: INTERNAL MEDICINE | Admitting: INTERNAL MEDICINE
Payer: MEDICARE

## 2018-02-23 VITALS
HEART RATE: 95 BPM | TEMPERATURE: 98 F | BODY MASS INDEX: 28.63 KG/M2 | OXYGEN SATURATION: 96 % | HEIGHT: 70 IN | SYSTOLIC BLOOD PRESSURE: 146 MMHG | WEIGHT: 200 LBS | RESPIRATION RATE: 16 BRPM | DIASTOLIC BLOOD PRESSURE: 82 MMHG

## 2018-02-23 DIAGNOSIS — I25.811 ATHEROSCLEROSIS OF NATIVE CORONARY ARTERY OF TRANSPLANTED HEART WITHOUT ANGINA PECTORIS: ICD-10-CM

## 2018-02-23 DIAGNOSIS — Z94.1 STATUS POST HEART TRANSPLANTATION: ICD-10-CM

## 2018-02-23 LAB
ABO + RH BLD: NORMAL
ALBUMIN SERPL BCP-MCNC: 3.4 G/DL
ALP SERPL-CCNC: 57 U/L
ALT SERPL W/O P-5'-P-CCNC: 28 U/L
ANION GAP SERPL CALC-SCNC: 9 MMOL/L
AST SERPL-CCNC: 19 U/L
BASOPHILS # BLD AUTO: 0.04 K/UL
BASOPHILS NFR BLD: 0.4 %
BILIRUB SERPL-MCNC: 1.2 MG/DL
BLD GP AB SCN CELLS X3 SERPL QL: NORMAL
BUN SERPL-MCNC: 20 MG/DL
CALCIUM SERPL-MCNC: 8.9 MG/DL
CHLORIDE SERPL-SCNC: 108 MMOL/L
CO2 SERPL-SCNC: 25 MMOL/L
CORONARY STENOSIS: ABNORMAL
CREAT SERPL-MCNC: 1.2 MG/DL
DIFFERENTIAL METHOD: ABNORMAL
EOSINOPHIL # BLD AUTO: 0.3 K/UL
EOSINOPHIL NFR BLD: 2.4 %
ERYTHROCYTE [DISTWIDTH] IN BLOOD BY AUTOMATED COUNT: 14 %
EST. GFR  (AFRICAN AMERICAN): >60 ML/MIN/1.73 M^2
EST. GFR  (NON AFRICAN AMERICAN): >60 ML/MIN/1.73 M^2
GLUCOSE SERPL-MCNC: 150 MG/DL
HCT VFR BLD AUTO: 43 %
HGB BLD-MCNC: 14.4 G/DL
IMM GRANULOCYTES # BLD AUTO: 0.06 K/UL
IMM GRANULOCYTES NFR BLD AUTO: 0.6 %
LYMPHOCYTES # BLD AUTO: 1.5 K/UL
LYMPHOCYTES NFR BLD: 14.8 %
MCH RBC QN AUTO: 30.1 PG
MCHC RBC AUTO-ENTMCNC: 33.5 G/DL
MCV RBC AUTO: 90 FL
MONOCYTES # BLD AUTO: 1.2 K/UL
MONOCYTES NFR BLD: 11.5 %
NEUTROPHILS # BLD AUTO: 7.3 K/UL
NEUTROPHILS NFR BLD: 70.3 %
NRBC BLD-RTO: 0 /100 WBC
PLATELET # BLD AUTO: 275 K/UL
PMV BLD AUTO: 9.5 FL
POCT GLUCOSE: 147 MG/DL (ref 70–110)
POTASSIUM SERPL-SCNC: 4.6 MMOL/L
PROT SERPL-MCNC: 6.7 G/DL
RBC # BLD AUTO: 4.78 M/UL
SODIUM SERPL-SCNC: 142 MMOL/L
WBC # BLD AUTO: 10.39 K/UL

## 2018-02-23 PROCEDURE — 25000003 PHARM REV CODE 250: Performed by: STUDENT IN AN ORGANIZED HEALTH CARE EDUCATION/TRAINING PROGRAM

## 2018-02-23 PROCEDURE — 99152 MOD SED SAME PHYS/QHP 5/>YRS: CPT

## 2018-02-23 PROCEDURE — 82962 GLUCOSE BLOOD TEST: CPT

## 2018-02-23 PROCEDURE — C1769 GUIDE WIRE: HCPCS

## 2018-02-23 PROCEDURE — 85025 COMPLETE CBC W/AUTO DIFF WBC: CPT

## 2018-02-23 PROCEDURE — 86901 BLOOD TYPING SEROLOGIC RH(D): CPT

## 2018-02-23 PROCEDURE — 99152 MOD SED SAME PHYS/QHP 5/>YRS: CPT | Mod: ,,, | Performed by: INTERNAL MEDICINE

## 2018-02-23 PROCEDURE — 93454 CORONARY ARTERY ANGIO S&I: CPT | Mod: 26,,, | Performed by: INTERNAL MEDICINE

## 2018-02-23 PROCEDURE — C1887 CATHETER, GUIDING: HCPCS

## 2018-02-23 PROCEDURE — 80053 COMPREHEN METABOLIC PANEL: CPT

## 2018-02-23 RX ORDER — SODIUM CHLORIDE 9 MG/ML
3 INJECTION, SOLUTION INTRAVENOUS CONTINUOUS
Status: ACTIVE | OUTPATIENT
Start: 2018-02-23 | End: 2018-02-23

## 2018-02-23 RX ORDER — DIPHENHYDRAMINE HCL 50 MG
50 CAPSULE ORAL ONCE
Status: COMPLETED | OUTPATIENT
Start: 2018-02-23 | End: 2018-02-23

## 2018-02-23 RX ORDER — SODIUM CHLORIDE 9 MG/ML
INJECTION, SOLUTION INTRAVENOUS CONTINUOUS
Status: ACTIVE | OUTPATIENT
Start: 2018-02-23 | End: 2018-02-23

## 2018-02-23 RX ADMIN — DIPHENHYDRAMINE HYDROCHLORIDE 50 MG: 50 CAPSULE ORAL at 06:02

## 2018-02-23 RX ADMIN — SODIUM CHLORIDE 3 ML/KG/HR: 0.9 INJECTION, SOLUTION INTRAVENOUS at 06:02

## 2018-02-23 NOTE — PROCEDURES
"            Interventional Cardiology   Post Cath Note      Referring Physician: Mir Nguyen MD  Procedure: [facility]   Indication: Post heart transplant     SUBJECTIVE:   Patient tolerated procedure well with no complications. Please see full cath report.    Cath Results:   Access:  Right radial, Right CFA       Left ventriculogram: LVEDP 12 mmHg     See full cath report for further details    Intervention:     Closure device used: Manual for groin, Vasc band for radial   Patient tolerated the procedure well, no complications    Post Cath Exam:   BP (!) 153/78 (BP Location: Left arm, Patient Position: Lying)   Pulse 92   Temp 97.1 °F (36.2 °C) (Oral)   Resp 18   Ht 5' 10" (1.778 m)   Wt 90.7 kg (200 lb)   SpO2 96%   BMI 28.70 kg/m²     No unusual pain, hematoma, thrill or bruit at vascular access site.  Distal pulse present without signs of ischemia.    Assessment / Plan:       - Routine post cath protocol.  - IVFs for NEMO prevention.  - Follow up with heart transplant as scheduled.     Attending addendum to follow     Martita Durham MD  Cardiology Fellow  Pager: [phone]        "

## 2018-02-23 NOTE — PLAN OF CARE
Problem: Patient Care Overview  Goal: Plan of Care Review  Outcome: Ongoing (interventions implemented as appropriate)  Received report from Rosy. Patient s/p [facility], AAOx3. VSS, no c/o pain or discomfort at this time, resp even and unlabored. Gauze/tegaderm dressing to R groin is CDI. Vascband to R wrist is CDI. No active bleeding. No hematoma noted. Post procedure protocol reviewed with patient and patient's family. Understanding verbalized. Family members at bedside. Nurse call bell within reach. Will continue to monitor per post procedure protocol.

## 2018-02-23 NOTE — H&P (VIEW-ONLY)
Cardiology Clinic Note  Reason for Visit: Surveillance LHC    HPI:   It was my pleasure today to meet Mr Hernandez, he is a pleasant 68 y.o male here today for a surveillance LHC after heart transplant.    Patient mentioned he has no chest pain but he has some SOB after walking about 2 blocks. In addition to bilateral lower extremities swelling at least once a week. He denies orthopnea or PND.  He denies palpitations, syncope, or near syncope. He is compliant with diet and medications and otherwise he feels well. He denied any fever and no chills and denied all other complains. He mentioned he doesn't check his BP frequently at home but when he checks its mostly <130 systolic.     He has PMH of OHTx 9/03, with significant CAV, HTN, stage II-III CKD, HLP. On 11/14 LHC demonstrated mid RCA , mid LCX 75%, luminal irregularities everywhere. The LCMX at the time was unchanged from 11/13, however the  of RCA was a 50% lesion by LHC 11/13. Patient had signficiant fatigue, yard work problems last year preceding his LHC, which is what prompted us to send him to Dr. Nguyen again. Since undergoing intervention on his RCA (PCI prox RCA, mid RCA, distal RCA with MADELINE x 3) on 01/13/17, had another LHC where he underwent intervention on his distal RCA  with MADELINE and mid RCA  with MADELINE, felt his mid LAD stenosis was 75% and unchanged and his 80% prox LCMX was unchanged as well from previous angiogram.       ROS:    Constitution: Negative for fever or chills. Negative for weight loss or gain.   HENT: Negative for sore throat or headaches. Negative for rhinorrhea.  Eyes: Negative for blurred or double vision.   Cardiovascular: See above  Pulmonary: Negative for SOB. Negative for cough.   Gastrointestinal: Negative for abdominal pain. Negative for nausea/ vomiting. Negative for diarrhea.   : Negative for dysuria.   Skin: Negative for rashes.  Neurological: Negative for focal weakness or sensory changes.  Psychological:  Negative for depression or anxiety.  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    Tubular adenoma of colon 10/10/2012     Past Surgical History:   Procedure Laterality Date    CARDIAC CATHETERIZATION      COLONOSCOPY N/A 9/2/2016    Procedure: COLONOSCOPY;  Surgeon: Stef Suazo MD;  Location: [facility];  Service: Endoscopy;  Laterality: N/A;    HEART TRANSPLANT      HEART TRANSPLANT      HEMORRHOID SURGERY      PARATHYROID GLAND SURGERY      SKIN BIOPSY      TONSILLECTOMY       Allergies:     Review of patient's allergies indicates:   Allergen Reactions    Shellfish containing products      Soft shell crab only     Medications:     Current Outpatient Prescriptions on File Prior to Visit   Medication Sig Dispense Refill    amlodipine (NORVASC) 5 MG tablet TAKE 2 TABLETS EVERY MORNING AND 1 TABLET EVERY EVENING DAILY 90 tablet 11    aspirin (ECOTRIN) 81 MG EC tablet Take 81 mg by mouth every evening.       atorvastatin (LIPITOR) 40 MG tablet TAKE (2) TABLETS DAILY. 60 tablet 11    clopidogrel (PLAVIX) 75 mg tablet TAKE ONE TABLET DAILY 30 tablet 11    folic acid-vit B6-vit B12 (FOLTX) 2.5-25-2 mg tablet Take 1 tablet by mouth once daily.        furosemide (LASIX) 20 MG tablet TAKE (1) TABLET DAILY. 30 tablet 11    hydrALAZINE (APRESOLINE) 25 MG tablet TAKE  (1)  TABLET  THREE TIMES DAILY. 90 tablet 11    omega-3 acid ethyl esters (LOVAZA) 1 gram capsule Take 2 capsules (2 g total) by mouth 2 (two) times daily. (Patient taking differently: Take 2 g by mouth every evening. ) 360 capsule 3    predniSONE (DELTASONE) 5 MG tablet TAKE (1) TABLET DAILY. 30 tablet 11    ranitidine (ZANTAC) 150 MG tablet Take 1 tablet (150 mg total) by mouth once daily. 90 tablet 3    tacrolimus  "(PROGRAF) 1 MG Cap TAKE 2 CAPSULES BY MOUTH IN THE MORNING AND TAKE 3 CAPSULES IN THE EVENING 450 capsule 0    tamsulosin (FLOMAX) 0.4 mg Cp24 Take 1 capsule (0.4 mg total) by mouth once daily. 30 capsule 11    ezetimibe (ZETIA) 10 mg tablet TAKE 1 TABLET ONCE DAILY IN THE EVENING 30 tablet 11    fluorouracil (EFUDEX) 5 % cream AAA bid x 4 weeks to biopsy site of scalp 40 g 1    ZETIA 10 mg tablet TAKE 1 TABLET ONCE DAILY IN THE EVENING 30 tablet 11     No current facility-administered medications on file prior to visit.      Social History:     Social History   Substance Use Topics    Smoking status: Former Smoker     Types: Pipe     Quit date: 8/1/2016    Smokeless tobacco: Never Used    Alcohol use 0.0 oz/week      Comment: rare alcohol use     Family History:     Family History   Problem Relation Age of Onset    Hypertension Mother     Heart attack Mother     Hypertension Father     Heart attack Father     Heart failure Father     Heart attack Sister     Heart attack Brother     Heart attack Maternal Grandmother     Heart failure Maternal Grandmother     Heart attack Maternal Grandfather     Heart failure Maternal Grandfather     Heart failure Sister     Heart failure Brother     Cancer Sister     Melanoma Neg Hx     Eczema Neg Hx     Lupus Neg Hx     Psoriasis Neg Hx      Physical Exam:     Vitals:    02/22/18 1024 02/22/18 1027   BP: 135/80 131/85   BP Location: Left arm Right arm   Patient Position: Sitting Sitting   BP Method: Large (Automatic) Large (Automatic)   Pulse: 110 108   SpO2: 96%    Weight: 91 kg (200 lb 9.9 oz)    Height: 5' 10" (1.778 m)        Constitutional: No apparent distress, conversant  HEENT: Sclera anicteric, extraocular movements intact  Neck: No jugular venous distension, no carotid bruits  CV: Regular rate and rhythm, no murmurs rubs or gallops, normal S1/S2  Pulm: Clear to auscultation bilaterally, no wheezes, rales, or ronchi  GI: Abdomen soft, nontender, " nondistended, normoactive bowel sounds  Extremities: No lower extremity edema, warm with palpable pulses  Skin: No ecchymosis, erythema, or ulcers  Psych: Alert and oriented to person place location, appropriate affect  Neuro: No focal deficits    Labs:     Lab Results   Component Value Date     02/19/2018    K 4.1 02/19/2018     02/19/2018    CO2 26 02/19/2018    BUN 22 02/19/2018    CREATININE 1.3 02/19/2018    ANIONGAP 14 02/19/2018     Lab Results   Component Value Date    AST 25 02/19/2018    ALT 41 02/19/2018    ALKPHOS 62 02/19/2018    BILITOT 1.3 (H) 02/19/2018    ALBUMIN 3.6 02/19/2018     Lab Results   Component Value Date    CALCIUM 9.1 02/19/2018    MG 1.5 (L) 02/19/2018     Lab Results   Component Value Date    BNP 48 02/19/2018    BNP 54 12/04/2017    BNP 80 09/22/2017    Lab Results   Component Value Date    WBC 10.97 02/19/2018    HGB 15.0 02/19/2018    HCT 46.5 02/19/2018     02/19/2018    GRAN 6.7 02/19/2018    GRAN 61.1 02/19/2018     Lab Results   Component Value Date    INR 0.9 11/13/2015     Lab Results   Component Value Date    CHOL 128 02/19/2018    HDL 45 02/19/2018    LDLCALC 37.0 (L) 02/19/2018    TRIG 230 (H) 02/19/2018     Lab Results   Component Value Date    HGBA1C 6.6 (H) 12/04/2017     Lab Results   Component Value Date    TSH 3.742 12/15/2016          Imaging:       EF   Date Value Ref Range Status   09/22/2017 60 55 - 65    01/12/2017 60 55 - 65    09/29/2016 60 55 - 65          Assessment:     1. Heart replaced by transplant    2. Coronary artery disease involving native artery of transplanted heart without angina pectoris    3. Vasculopathy    4. Transplanted heart    5. Essential hypertension    6. CKD (chronic kidney disease) stage 2, GFR 60-89 ml/min    7. Hyperlipidemia, unspecified hyperlipidemia type      Plan:   1. Heart transplant:  -He has some SOB after walking about 2 Blocks with bilateral lower extremities swelling at least once a week.  -Will  proceed with [facility].  -Bilateral Compression stocking ordered.   -Right radial access.  -5 Fr sheath.  -The risks, benefits & alternatives of the procedure were explained to the patient.   -The risks of coronary angiography include but are not limited to: Bleeding, infection, heart rhythm abnormalities, allergic reactions, kidney injury requiring dilaysis, stroke and death.   -The risks of moderate sedation include hypotension, respiratory depression, arrhythmias, bronchospasm, & death.   -Informed consent was obtained & the patient is agreeable to proceed with the procedure.    2.Hypertension:  -Well controlled we advised him to continue same medical regimen.     3.CKD:  -Last Cr 1.3; avoid nephrotix medications; oral hydration the day prior to cath and IV HYDRATION THE DAY OF CATH    4.Hyperlipidemia:  -continue statin and heart healthy diet    5. BILATERAL le edma. rec comprssion stockings; will provide prescription  Attending addendum to follow     Martita Durham MD  Cardiology Fellow  Pager: [phone]    I have personally taken the history and examined this patient and agree with the resident's note as stated above.  All of the patient's and his wife's questions were answered.

## 2018-02-23 NOTE — DISCHARGE INSTRUCTIONS
Discharge Instructions and Care of Your Wrist After a Cardiac Catheterization Procedure Performed via the Radial Artery    For 3-5 days following the procedure:  Do not subject your affected hand/arm to any forceful movements (i.e. supporting weight when rising from a chair or bed)  Avoid excessive (extension/flexion) wrist movement (i.e. supporting weight when rising from a chair or bed, push-ups, lifting garage doors, etc.)  Do not drive a car for 24 hours  The dressing on the puncture site may be removed after 24 hours and left open to air. If there is minor oozing, you may apply a Band-aid and remove after 12 hours  You may shower on the day following your procedure. Do not take a tub bath or submerge the puncture site in water for 3 days following the procedure  Do not lift anything heavier than 3 to 5 pounds with the affected hand/arm  Do not operate a lawnmower, motorcycle, chainsaw, or all-terrain vehicle   Do not engage in vigorous exercise (i.e. Tennis, Golf, Bowling) using the affected arm    If bleeding should occur following discharge:  Sit down and apply firm pressure to the puncture site with your fingers for 10 minutes   If the bleeding stops, continue to sit quietly, keeping your wrist straight for 2 hours. Notify your physician as soon as possible   If bleeding does not stop after 10 minutes or if there is a large amount of bleeding or spurting, call 911 immediately. Do not drive yourself to the hospital.     You should expect mild tingling in your hand and tenderness at the puncture site for up to 3 days.     Notify your physician if these symptoms persist or if you experience:  Change in color or temperature of the hand or arm  Redness, heat, or pus at the puncture site  Chills or fever greater than 101.0 F    1. Do not strain or lift anything greater than 5 lb for 3 days.   2. Do not drive or operate any dangerous machinery for 24 hours.   3. Keep the dressing on, clean, and dry for 24 hours.    4. After 24 hours, the dressing may be removed and a shower is allowed.   5. Clean the area with mild soap and water.   6. Once the skin has healed (3 days), bathing in a tub or swimming is allowed.   7. Inspect the groin site daily and report to the physician any signs of infection at the site: redness, pain, fever >100.4, unusual pain at the   access site or affected extremity, unusual swelling at the access site, or any yellow, white or green drainage.  Call 911 if you have:   Bleeding from the puncture site that you cannot stop by doing the following:   Relax and lie down right away. Keep your leg flat and apply firm pressure to the site using your fingers and a gauze pad. Keep the pressure on for 10 minutes. Continue this until the bleeding stops. This may take awhile. When bleeding stops, cover the site with a sterile bandage and keep your leg still as much as possible.

## 2018-02-23 NOTE — TELEPHONE ENCOUNTER
Spoke to pt and wife in cath lab holding area. Discussed cath results which were essentially unchanged. Will schedule a Pet stress in the future at [facility] per pt request. Pt understands he needs to get his DM under control. They both are decreasing carbs in diet.

## 2018-02-23 NOTE — PROGRESS NOTES
Pt DC'd per MD order. Discharge instructions given including activity,  wound care, S&S of infections, future appointments, and when to call MD. Medications reviewed including when to take next dose. Telemetry and PIV DC'd, catheter tip intact. Pt refused transport and ambulated off unit with wife.

## 2018-02-23 NOTE — INTERVAL H&P NOTE
The patient has been examined and the H&P has been reviewed:    I concur with the findings and no changes have occurred since H&P was written.    Anesthesia/Surgery risks, benefits and alternative options discussed and understood by patient/family.      Attending addendum to follow     Martita Durham MD  Cardiology Fellow  Pager: [phone]        Active Hospital Problems    Diagnosis  POA    Status post heart transplantation [Z94.1]  Not Applicable      Resolved Hospital Problems    Diagnosis Date Resolved POA   No resolved problems to display.

## 2018-02-23 NOTE — DISCHARGE SUMMARY
Discharge Summary  Interventional Cardiology      Admit Date: 2/23/2018    Discharge Date:  2/23/2018    Attending Physician: Mir Nguyen MD    Discharge Physician: Martita Durham MD    Principal Diagnoses: <principal problem not specified>  Indication for Admission: HEART CATH-LEFT (Left)    Discharged Condition: Good    Hospital Course:   Patient presented for outpatient LHC which went without complication. Right groin intact with no sign of bleeding and no hematoma. Vasc band removed with right radial pulse 2+.     Outpatient Plan:  - No changes in medications.   - Follow up with heart transplant team as scheduled.      Diet: Cardiac diet    Activity: Ad rene    Disposition: Home or Self Care    Discharge Medications:      Medication List      CONTINUE taking these medications    amLODIPine 5 MG tablet  Commonly known as:  NORVASC  TAKE 2 TABLETS EVERY MORNING AND 1 TABLET EVERY EVENING DAILY     aspirin 81 MG EC tablet  Commonly known as:  ECOTRIN     atorvastatin 40 MG tablet  Commonly known as:  LIPITOR  TAKE (2) TABLETS DAILY.     clopidogrel 75 mg tablet  Commonly known as:  PLAVIX  TAKE ONE TABLET DAILY     fluorouracil 5 % cream  Commonly known as:  EFUDEX  AAA bid x 4 weeks to biopsy site of scalp     FOLTX 2.5-25-2 mg Tab  Generic drug:  folic acid-vit B6-vit B12 2.5-25-2 mg     furosemide 20 MG tablet  Commonly known as:  LASIX  TAKE (1) TABLET DAILY.     hydrALAZINE 25 MG tablet  Commonly known as:  APRESOLINE  TAKE  (1)  TABLET  THREE TIMES DAILY.     metFORMIN 500 MG 24 hr tablet  Commonly known as:  GLUCOPHAGE-XR     omega-3 acid ethyl esters 1 gram capsule  Commonly known as:  LOVAZA  Take 2 capsules (2 g total) by mouth 2 (two) times daily.     predniSONE 5 MG tablet  Commonly known as:  DELTASONE  TAKE (1) TABLET DAILY.     ranitidine 150 MG tablet  Commonly known as:  ZANTAC  Take 1 tablet (150 mg total) by mouth once daily.     tacrolimus 1 MG Cap  Commonly known as:  PROGRAF  TAKE 2  CAPSULES BY MOUTH IN THE MORNING AND TAKE 3 CAPSULES IN THE EVENING     tamsulosin 0.4 mg Cp24  Commonly known as:  FLOMAX  Take 1 capsule (0.4 mg total) by mouth once daily.     * ZETIA 10 mg tablet  Generic drug:  ezetimibe  TAKE 1 TABLET ONCE DAILY IN THE EVENING     * ezetimibe 10 mg tablet  Commonly known as:  ZETIA  TAKE 1 TABLET ONCE DAILY IN THE EVENING        * This list has 2 medication(s) that are the same as other medications prescribed for you. Read the directions carefully, and ask your doctor or other care provider to review them with you.            Attending addendum to follow     Martita Durham MD  Cardiology Fellow  Pager: [phone]

## 2018-02-24 LAB
C1Q1 TESTING DATE: NORMAL
C1Q1 TESTING DATE: NORMAL
C1Q2 TESTING DATE: NORMAL
CLASS I ANTIBODY COMMENTS - LUMINEX: NORMAL
CLASS II ANTIBODY COMMENTS - LUMINEX: NORMAL
SERUM COLLECTION DT - LUMINEX CLASS I: NORMAL
SERUM COLLECTION DT - LUMINEX CLASS II: NORMAL

## 2018-02-25 DIAGNOSIS — Z94.1 TRANSPLANTED HEART: ICD-10-CM

## 2018-02-25 DIAGNOSIS — I99.9 VASCULOPATHY: Primary | ICD-10-CM

## 2018-02-25 NOTE — Clinical Note
Sam Escobar and Franchesca, will you please schedule this patient for a PET stress in the next 2 weeks or so? Thank you. 
Dr. Hampton

## 2018-02-25 NOTE — PROGRESS NOTES
Patient underwent LHC which demonstrated 60% RCA instent restenosis, and unchanged LAD/CMX disease. Spoke with Dr. Justyn Nguyen, will obtain PET stress and see if ischemia is an etiology of his progressive ALCANTAR/SOB symptoms. Discussed with his transplant coordinator Denita Armenta as well.

## 2018-02-26 ENCOUNTER — PATIENT MESSAGE (OUTPATIENT)
Dept: TRANSPLANT | Facility: CLINIC | Age: 68
End: 2018-02-26

## 2018-02-26 ENCOUNTER — OFFICE VISIT (OUTPATIENT)
Dept: UROLOGY | Facility: CLINIC | Age: 68
End: 2018-02-26
Payer: MEDICARE

## 2018-02-26 VITALS
WEIGHT: 200.63 LBS | SYSTOLIC BLOOD PRESSURE: 126 MMHG | HEIGHT: 70 IN | DIASTOLIC BLOOD PRESSURE: 82 MMHG | BODY MASS INDEX: 28.72 KG/M2 | HEART RATE: 80 BPM

## 2018-02-26 DIAGNOSIS — N40.0 BENIGN PROSTATIC HYPERPLASIA, UNSPECIFIED WHETHER LOWER URINARY TRACT SYMPTOMS PRESENT: Primary | ICD-10-CM

## 2018-02-26 DIAGNOSIS — Z12.5 PROSTATE CANCER SCREENING: ICD-10-CM

## 2018-02-26 LAB
BILIRUB SERPL-MCNC: NORMAL MG/DL
BLOOD URINE, POC: NORMAL
COLOR, POC UA: YELLOW
GLUCOSE UR QL STRIP: 250
KETONES UR QL STRIP: NORMAL
LEUKOCYTE ESTERASE URINE, POC: NORMAL
NITRITE, POC UA: NORMAL
PH, POC UA: 5
PROTEIN, POC: NORMAL
SPECIFIC GRAVITY, POC UA: 1.02
UROBILINOGEN, POC UA: NORMAL

## 2018-02-26 PROCEDURE — 1126F AMNT PAIN NOTED NONE PRSNT: CPT | Mod: ,,, | Performed by: UROLOGY

## 2018-02-26 PROCEDURE — 99214 OFFICE O/P EST MOD 30 MIN: CPT | Mod: S$PBB,,, | Performed by: UROLOGY

## 2018-02-26 PROCEDURE — 81002 URINALYSIS NONAUTO W/O SCOPE: CPT | Mod: PBBFAC | Performed by: UROLOGY

## 2018-02-26 PROCEDURE — 1159F MED LIST DOCD IN RCRD: CPT | Mod: ,,, | Performed by: UROLOGY

## 2018-02-26 PROCEDURE — 99999 PR PBB SHADOW E&M-EST. PATIENT-LVL II: CPT | Mod: PBBFAC,,, | Performed by: UROLOGY

## 2018-02-26 PROCEDURE — 99212 OFFICE O/P EST SF 10 MIN: CPT | Mod: PBBFAC | Performed by: UROLOGY

## 2018-02-26 RX ORDER — TAMSULOSIN HYDROCHLORIDE 0.4 MG/1
0.4 CAPSULE ORAL DAILY
Qty: 30 CAPSULE | Refills: 11 | Status: SHIPPED | OUTPATIENT
Start: 2018-02-26 | End: 2018-08-27 | Stop reason: SDUPTHER

## 2018-02-26 RX ORDER — FINASTERIDE 5 MG/1
5 TABLET, FILM COATED ORAL DAILY
Qty: 30 TABLET | Refills: 11 | Status: SHIPPED | OUTPATIENT
Start: 2018-02-26 | End: 2018-08-27

## 2018-02-26 NOTE — PROGRESS NOTES
"Chief Complaint: LUTS    HPI:   2/26/18: Had a heart cath last week.  Voiding fine.  Doesn't feel he empties all the way.  Not taking the flomax hasn't missed it.  Drinks a lot of coffee, down to 3 cups a day.  8/23/17: No microhematuria observed at last visit.  No LUTS but mild retention.  8/23/16: Pt had some frequency and is taking a OTC supplement and it made things better; he hasn't taken it in three weeks but frequency is still better.  Is a heart transplant pt and this appt was made when he discussed his LUTS with the transplant service.  Overall feeling fine no problems to speak of.  Had a CT 6/16 that shows some renal cysts no other sig findings. Nocturia x2.  3/15: Elva: "65-year-old male returns to the clinic today for follow-up on his urinary infection.  Since his last visit when we had given him the antibiotics, his symptoms have cleared.  He is not having any urinary problems at present.  He still has nocturia ×1, but is otherwise doing very well. His urine culture did not show any growth, but his symptoms have cleared and his urinalysis today is also normal.  There was a small amount of blood on the dipstick, but only an occasional RBC under the microscope, and no leukocytes or nitrites."    Allergies:  Shellfish containing products    Medications:  has a current medication list which includes the following prescription(s): amlodipine, aspirin, atorvastatin, clopidogrel, ezetimibe, fluorouracil, folic acid-vit b6-vit b12 2.5-25-2 mg, furosemide, hydralazine, metformin, omega-3 acid ethyl esters, prednisone, ranitidine, tacrolimus, tamsulosin, and zetia.    Review of Systems:  General: No fever, chills, fatigability, or weight loss.  Skin: No rashes, itching, or changes in color or texture of skin.  Chest: Denies ALCANTAR, cyanosis, wheezing, cough, and sputum production.  Abdomen: Appetite fine. No weight loss. Denies diarrhea, abdominal pain, hematemesis, or blood in stool.  Musculoskeletal: No joint " stiffness or swelling. Denies back pain.  : As above.  All other review of systems negative.    PMH:   has a past medical history of CHF (congestive heart failure); Coronary artery disease; Heart attack; Hyperlipidemia; Hypertension; Osteoarthritis of hip (7/25/2016); Squamous cell carcinoma (4/2015); and Tubular adenoma of colon (10/10/2012).    PSH:   has a past surgical history that includes Heart transplant; Hemorrhoid surgery; Parathyroid gland surgery; Tonsillectomy; Cardiac catheterization; Colonoscopy (N/A, 9/2/2016); Skin biopsy; and Heart transplant.    FamHx: family history includes Cancer in his sister; Heart attack in his brother, father, maternal grandfather, maternal grandmother, mother, and sister; Heart failure in his brother, father, maternal grandfather, maternal grandmother, and sister; Hypertension in his father and mother.    SocHx:  reports that he quit smoking about 18 months ago. His smoking use included Pipe. He has never used smokeless tobacco. He reports that he drinks alcohol. He reports that he does not use drugs.      Physical Exam:  Vitals:    02/26/18 1104   BP: 126/82   Pulse: 80     General: A&Ox3, no apparent distress, no deformities  Neck: No masses, normal thyroid  Lungs: normal inspiration, no use of accessory muscles  Heart: normal pulse, no arrhythmias  Abdomen: Soft, NT, ND  Skin: The skin is warm and dry. No jaundice.  Ext: No c/c/e.  :   8/17: Test desc kenn, no abnormalities of epididymus. Penis normal, with normal penile and scrotal skin. Meatus normal. Normal rectal tone, no hemorrhoids. Prost 40 gm no nodules or masses appreciated. SV not palpable. Perineum and anus normal.    Labs/Studies:   Urinalysis performed in clinic, summary: UA normal  PSA    6/16: 2.0    8/17: 2.5  Bladder Scan performed in office:     8/17:  ml.    2/26/18:  ml    Impression/Plan:   1.  Resume flomax, PVR worse off of it.  Adding finasteride to it.  2.  PSA in 6 MO.

## 2018-03-12 ENCOUNTER — CLINICAL SUPPORT (OUTPATIENT)
Dept: CARDIOLOGY | Facility: CLINIC | Age: 68
End: 2018-03-12
Attending: INTERNAL MEDICINE
Payer: MEDICARE

## 2018-03-12 DIAGNOSIS — I99.9 VASCULOPATHY: ICD-10-CM

## 2018-03-12 DIAGNOSIS — Z94.1 TRANSPLANTED HEART: ICD-10-CM

## 2018-03-12 LAB — DIASTOLIC DYSFUNCTION: NO

## 2018-03-12 PROCEDURE — 93016 CV STRESS TEST SUPVJ ONLY: CPT | Mod: S$PBB,,, | Performed by: INTERNAL MEDICINE

## 2018-03-12 PROCEDURE — 93018 CV STRESS TEST I&R ONLY: CPT | Mod: S$PBB,,, | Performed by: INTERNAL MEDICINE

## 2018-03-12 PROCEDURE — 78492 MYOCRD IMG PET MLT RST&STRS: CPT | Mod: 26,S$PBB,, | Performed by: INTERNAL MEDICINE

## 2018-03-13 ENCOUNTER — CONFERENCE (OUTPATIENT)
Dept: TRANSPLANT | Facility: CLINIC | Age: 68
End: 2018-03-13

## 2018-03-13 NOTE — TELEPHONE ENCOUNTER
Chart reviewed for PET study  (2) studies NORMAL MYOCARDIAL PERFUSION PET STRESS TEST  (3) Changes none   (4) immunosuppression goals Tacrolimus ( 6 to 8), prednisone  (5) followup nothing to do

## 2018-05-02 RX ORDER — TACROLIMUS 1 MG/1
CAPSULE ORAL
Qty: 450 CAPSULE | Refills: 0 | Status: SHIPPED | OUTPATIENT
Start: 2018-05-02 | End: 2019-11-11 | Stop reason: SDUPTHER

## 2018-05-18 ENCOUNTER — PATIENT MESSAGE (OUTPATIENT)
Dept: TRANSPLANT | Facility: CLINIC | Age: 68
End: 2018-05-18

## 2018-05-18 ENCOUNTER — TELEPHONE (OUTPATIENT)
Dept: TRANSPLANT | Facility: CLINIC | Age: 68
End: 2018-05-18

## 2018-06-07 DIAGNOSIS — Z94.1 HEART REPLACED BY TRANSPLANT: ICD-10-CM

## 2018-06-07 RX ORDER — AMLODIPINE BESYLATE 5 MG/1
TABLET ORAL
Qty: 90 TABLET | Refills: 11 | Status: SHIPPED | OUTPATIENT
Start: 2018-06-07 | End: 2019-06-10 | Stop reason: SDUPTHER

## 2018-07-11 ENCOUNTER — PATIENT MESSAGE (OUTPATIENT)
Dept: TRANSPLANT | Facility: CLINIC | Age: 68
End: 2018-07-11

## 2018-07-27 ENCOUNTER — PATIENT MESSAGE (OUTPATIENT)
Dept: TRANSPLANT | Facility: CLINIC | Age: 68
End: 2018-07-27

## 2018-08-27 ENCOUNTER — OFFICE VISIT (OUTPATIENT)
Dept: UROLOGY | Facility: CLINIC | Age: 68
End: 2018-08-27
Payer: MEDICARE

## 2018-08-27 ENCOUNTER — LAB VISIT (OUTPATIENT)
Dept: LAB | Facility: HOSPITAL | Age: 68
End: 2018-08-27
Attending: UROLOGY
Payer: MEDICARE

## 2018-08-27 VITALS
HEART RATE: 127 BPM | BODY MASS INDEX: 27.75 KG/M2 | HEIGHT: 70 IN | WEIGHT: 193.81 LBS | DIASTOLIC BLOOD PRESSURE: 70 MMHG | SYSTOLIC BLOOD PRESSURE: 118 MMHG

## 2018-08-27 DIAGNOSIS — N40.0 BENIGN PROSTATIC HYPERPLASIA, UNSPECIFIED WHETHER LOWER URINARY TRACT SYMPTOMS PRESENT: ICD-10-CM

## 2018-08-27 DIAGNOSIS — Z12.5 PROSTATE CANCER SCREENING: Primary | ICD-10-CM

## 2018-08-27 DIAGNOSIS — Z12.5 PROSTATE CANCER SCREENING: ICD-10-CM

## 2018-08-27 LAB
BILIRUB SERPL-MCNC: NORMAL MG/DL
BLOOD URINE, POC: NORMAL
COLOR, POC UA: YELLOW
COMPLEXED PSA SERPL-MCNC: 1.6 NG/ML
GLUCOSE UR QL STRIP: 50
KETONES UR QL STRIP: NORMAL
LEUKOCYTE ESTERASE URINE, POC: NORMAL
NITRITE, POC UA: NORMAL
PH, POC UA: 5
PROTEIN, POC: NORMAL
SPECIFIC GRAVITY, POC UA: 1.01
UROBILINOGEN, POC UA: NORMAL

## 2018-08-27 PROCEDURE — 99999 PR PBB SHADOW E&M-EST. PATIENT-LVL II: CPT | Mod: PBBFAC,,, | Performed by: UROLOGY

## 2018-08-27 PROCEDURE — 99212 OFFICE O/P EST SF 10 MIN: CPT | Mod: PBBFAC | Performed by: UROLOGY

## 2018-08-27 PROCEDURE — 81002 URINALYSIS NONAUTO W/O SCOPE: CPT | Mod: PBBFAC | Performed by: UROLOGY

## 2018-08-27 PROCEDURE — 99214 OFFICE O/P EST MOD 30 MIN: CPT | Mod: S$PBB,,, | Performed by: UROLOGY

## 2018-08-27 PROCEDURE — 36415 COLL VENOUS BLD VENIPUNCTURE: CPT

## 2018-08-27 PROCEDURE — 84153 ASSAY OF PSA TOTAL: CPT

## 2018-08-27 RX ORDER — TAMSULOSIN HYDROCHLORIDE 0.4 MG/1
0.4 CAPSULE ORAL DAILY
Qty: 30 CAPSULE | Refills: 11 | Status: SHIPPED | OUTPATIENT
Start: 2018-08-27 | End: 2019-08-28 | Stop reason: SDUPTHER

## 2018-08-27 RX ORDER — FINASTERIDE 5 MG/1
5 TABLET, FILM COATED ORAL DAILY
Qty: 30 TABLET | Refills: 11 | Status: SHIPPED | OUTPATIENT
Start: 2018-08-27 | End: 2019-08-28 | Stop reason: SDUPTHER

## 2018-08-27 NOTE — PROGRESS NOTES
"Chief Complaint: LUTS    HPI:   8/27/18: Been on flomax/finasteride and voiding better after last 6 months.  Reviewed history in detail.  2 cups coffee now.  2/26/18: Had a heart cath last week.  Voiding fine.  Doesn't feel he empties all the way.  Not taking the flomax hasn't missed it.  Drinks a lot of coffee, down to 3 cups a day.  8/23/17: No microhematuria observed at last visit.  No LUTS but mild retention.  8/23/16: Pt had some frequency and is taking a OTC supplement and it made things better; he hasn't taken it in three weeks but frequency is still better.  Is a heart transplant pt and this appt was made when he discussed his LUTS with the transplant service.  Overall feeling fine no problems to speak of.  Had a CT 6/16 that shows some renal cysts no other sig findings. Nocturia x2.  3/15: Elva: "65-year-old male returns to the clinic today for follow-up on his urinary infection.  Since his last visit when we had given him the antibiotics, his symptoms have cleared.  He is not having any urinary problems at present.  He still has nocturia ×1, but is otherwise doing very well. His urine culture did not show any growth, but his symptoms have cleared and his urinalysis today is also normal.  There was a small amount of blood on the dipstick, but only an occasional RBC under the microscope, and no leukocytes or nitrites."    Allergies:  Shellfish containing products    Medications:  has a current medication list which includes the following prescription(s): amlodipine, aspirin, atorvastatin, clopidogrel, ezetimibe, finasteride, fluorouracil, folic acid-vit b6-vit b12 2.5-25-2 mg, furosemide, hydralazine, metformin, omega-3 acid ethyl esters, prednisone, ranitidine, tacrolimus, tamsulosin, and zetia.    Review of Systems:  General: No fever, chills, fatigability, or weight loss.  Skin: No rashes, itching, or changes in color or texture of skin.  Chest: Denies ALCANTAR, cyanosis, wheezing, cough, and sputum " production.  Abdomen: Appetite fine. No weight loss. Denies diarrhea, abdominal pain, hematemesis, or blood in stool.  Musculoskeletal: No joint stiffness or swelling. Denies back pain.  : As above.  All other review of systems negative.    PMH:   has a past medical history of CHF (congestive heart failure), Coronary artery disease, Heart attack, Hyperlipidemia, Hypertension, Osteoarthritis of hip (7/25/2016), Squamous cell carcinoma (4/2015), and Tubular adenoma of colon (10/10/2012).    PSH:   has a past surgical history that includes Heart transplant; Hemorrhoid surgery; Parathyroid gland surgery; Tonsillectomy; Cardiac catheterization; Skin biopsy; Heart transplant; HEART CATH-LEFT (Left, 2/23/2018); HEART CATH-LEFT (Left, 1/13/2017); STENT-CORONARY (N/A, 1/13/2017); COLONOSCOPY (N/A, 9/2/2016); HEART CATH-LEFT (Left, 11/13/2015); STENT-CORONARY (N/A, 1/23/2015); HEART CATH-LEFT (Left, 11/21/2014); and CATHETERIZATION, HEART, LEFT (Left, 11/22/2013).    FamHx: family history includes Cancer in his sister; Heart attack in his brother, father, maternal grandfather, maternal grandmother, mother, and sister; Heart failure in his brother, father, maternal grandfather, maternal grandmother, and sister; Hypertension in his father and mother.    SocHx:  reports that he quit smoking about 2 years ago. His smoking use included pipe. he has never used smokeless tobacco. He reports that he drinks alcohol. He reports that he does not use drugs.      Physical Exam:  Vitals:    08/27/18 1048   BP: 118/70   Pulse: (!) 127     General: A&Ox3, no apparent distress, no deformities  Neck: No masses, normal thyroid  Lungs: normal inspiration, no use of accessory muscles  Heart: normal pulse, no arrhythmias  Abdomen: Soft, NT, ND  Skin: The skin is warm and dry. No jaundice.  Ext: No c/c/e.  :   8/17: Test desc kenn, no abnormalities of epididymus. Penis normal, with normal penile and scrotal skin. Meatus normal. Normal rectal  tone, no hemorrhoids. Prost 40 gm no nodules or masses appreciated. SV not palpable. Perineum and anus normal.    Labs/Studies:   Urinalysis performed in clinic, summary: UA normal  PSA    6/16: 2.0    8/17: 2.5  Bladder Scan performed in office:     8/17:  ml.    2/26/18:  ml    Impression/Plan:   1.  Flomax/finasteride have allowed improvement.  2.  PSA today and 6/12 with RTC 12 mo

## 2018-09-04 ENCOUNTER — PATIENT MESSAGE (OUTPATIENT)
Dept: TRANSPLANT | Facility: CLINIC | Age: 68
End: 2018-09-04

## 2018-09-06 ENCOUNTER — LAB VISIT (OUTPATIENT)
Dept: LAB | Facility: HOSPITAL | Age: 68
End: 2018-09-06
Attending: INTERNAL MEDICINE
Payer: MEDICARE

## 2018-09-06 DIAGNOSIS — E78.2 MIXED HYPERLIPIDEMIA: ICD-10-CM

## 2018-09-06 DIAGNOSIS — Z94.1 STATUS POST HEART TRANSPLANT: ICD-10-CM

## 2018-09-06 DIAGNOSIS — Z79.899 ENCOUNTER FOR LONG-TERM (CURRENT) USE OF MEDICATIONS: ICD-10-CM

## 2018-09-06 DIAGNOSIS — T86.290 CARDIAC ALLOGRAFT VASCULOPATHY: ICD-10-CM

## 2018-09-06 LAB
ALBUMIN SERPL BCP-MCNC: 3.8 G/DL
ALP SERPL-CCNC: 69 U/L
ALT SERPL W/O P-5'-P-CCNC: 21 U/L
ANION GAP SERPL CALC-SCNC: 12 MMOL/L
AST SERPL-CCNC: 17 U/L
BASOPHILS # BLD AUTO: 0.05 K/UL
BASOPHILS NFR BLD: 0.3 %
BILIRUB SERPL-MCNC: 1.1 MG/DL
BNP SERPL-MCNC: 70 PG/ML
BUN SERPL-MCNC: 19 MG/DL
CALCIUM SERPL-MCNC: 9.4 MG/DL
CHLORIDE SERPL-SCNC: 101 MMOL/L
CHOLEST SERPL-MCNC: 131 MG/DL
CHOLEST/HDLC SERPL: 3.7 {RATIO}
CO2 SERPL-SCNC: 27 MMOL/L
CREAT SERPL-MCNC: 1.4 MG/DL
DIFFERENTIAL METHOD: ABNORMAL
EOSINOPHIL # BLD AUTO: 0.2 K/UL
EOSINOPHIL NFR BLD: 1.4 %
ERYTHROCYTE [DISTWIDTH] IN BLOOD BY AUTOMATED COUNT: 14.6 %
EST. GFR  (AFRICAN AMERICAN): 58.4 ML/MIN/1.73 M^2
EST. GFR  (NON AFRICAN AMERICAN): 50.5 ML/MIN/1.73 M^2
ESTIMATED AVG GLUCOSE: 171 MG/DL
GLUCOSE SERPL-MCNC: 162 MG/DL
HBA1C MFR BLD HPLC: 7.6 %
HCT VFR BLD AUTO: 46.4 %
HDLC SERPL-MCNC: 35 MG/DL
HDLC SERPL: 26.7 %
HGB BLD-MCNC: 15.3 G/DL
IMM GRANULOCYTES # BLD AUTO: 0.12 K/UL
IMM GRANULOCYTES NFR BLD AUTO: 0.8 %
LDLC SERPL CALC-MCNC: 38.8 MG/DL
LYMPHOCYTES # BLD AUTO: 1.7 K/UL
LYMPHOCYTES NFR BLD: 11.8 %
MAGNESIUM SERPL-MCNC: 1.4 MG/DL
MCH RBC QN AUTO: 30.7 PG
MCHC RBC AUTO-ENTMCNC: 33 G/DL
MCV RBC AUTO: 93 FL
MONOCYTES # BLD AUTO: 1.2 K/UL
MONOCYTES NFR BLD: 7.9 %
NEUTROPHILS # BLD AUTO: 11.4 K/UL
NEUTROPHILS NFR BLD: 77.8 %
NONHDLC SERPL-MCNC: 96 MG/DL
NRBC BLD-RTO: 0 /100 WBC
PLATELET # BLD AUTO: 382 K/UL
PMV BLD AUTO: 9.3 FL
POTASSIUM SERPL-SCNC: 3.8 MMOL/L
PROT SERPL-MCNC: 7.2 G/DL
RBC # BLD AUTO: 4.99 M/UL
SODIUM SERPL-SCNC: 140 MMOL/L
TRIGL SERPL-MCNC: 286 MG/DL
WBC # BLD AUTO: 14.62 K/UL

## 2018-09-06 PROCEDURE — 86832 HLA CLASS I HIGH DEFIN QUAL: CPT | Mod: 91

## 2018-09-06 PROCEDURE — 86977 RBC SERUM PRETX INCUBJ/INHIB: CPT | Mod: 91

## 2018-09-06 PROCEDURE — 86832 HLA CLASS I HIGH DEFIN QUAL: CPT

## 2018-09-06 PROCEDURE — 36415 COLL VENOUS BLD VENIPUNCTURE: CPT | Mod: PO

## 2018-09-06 PROCEDURE — 80197 ASSAY OF TACROLIMUS: CPT

## 2018-09-06 PROCEDURE — 80061 LIPID PANEL: CPT

## 2018-09-06 PROCEDURE — 86833 HLA CLASS II HIGH DEFIN QUAL: CPT

## 2018-09-06 PROCEDURE — 80053 COMPREHEN METABOLIC PANEL: CPT

## 2018-09-06 PROCEDURE — 86833 HLA CLASS II HIGH DEFIN QUAL: CPT | Mod: 91

## 2018-09-06 PROCEDURE — 85025 COMPLETE CBC W/AUTO DIFF WBC: CPT

## 2018-09-06 PROCEDURE — 83880 ASSAY OF NATRIURETIC PEPTIDE: CPT

## 2018-09-06 PROCEDURE — 83036 HEMOGLOBIN GLYCOSYLATED A1C: CPT

## 2018-09-06 PROCEDURE — 83735 ASSAY OF MAGNESIUM: CPT

## 2018-09-07 LAB — TACROLIMUS BLD-MCNC: 6.2 NG/ML

## 2018-09-10 DIAGNOSIS — Z86.010 HISTORY OF COLONIC POLYPS: Primary | ICD-10-CM

## 2018-09-10 DIAGNOSIS — Z94.1 HEART REPLACED BY TRANSPLANT: ICD-10-CM

## 2018-09-10 LAB
CLASS I ANTIBODY COMMENTS - LUMINEX: NORMAL
CLASS II ANTIBODY COMMENTS - LUMINEX: NORMAL
DSA1 TESTING DATE: NORMAL
DSA12 TESTING DATE: NORMAL
DSA2 TESTING DATE: NORMAL
SERUM COLLECTION DT - LUMINEX CLASS I: NORMAL
SERUM COLLECTION DT - LUMINEX CLASS II: NORMAL

## 2018-09-13 ENCOUNTER — OFFICE VISIT (OUTPATIENT)
Dept: DERMATOLOGY | Facility: CLINIC | Age: 68
End: 2018-09-13
Payer: MEDICARE

## 2018-09-13 DIAGNOSIS — L82.1 SEBORRHEIC KERATOSIS: ICD-10-CM

## 2018-09-13 DIAGNOSIS — Z12.83 SCREENING, MALIGNANT NEOPLASM, SKIN: ICD-10-CM

## 2018-09-13 DIAGNOSIS — L90.5 SCAR CONDITIONS/SKIN FIBROSIS: Primary | ICD-10-CM

## 2018-09-13 DIAGNOSIS — L57.0 ACTINIC KERATOSES: ICD-10-CM

## 2018-09-13 DIAGNOSIS — Z85.828 HISTORY OF SKIN CANCER: ICD-10-CM

## 2018-09-13 PROCEDURE — 17003 DESTRUCT PREMALG LES 2-14: CPT | Mod: S$PBB,,, | Performed by: DERMATOLOGY

## 2018-09-13 PROCEDURE — 17003 DESTRUCT PREMALG LES 2-14: CPT | Mod: PBBFAC,PO | Performed by: DERMATOLOGY

## 2018-09-13 PROCEDURE — 17000 DESTRUCT PREMALG LESION: CPT | Mod: PBBFAC,PO | Performed by: DERMATOLOGY

## 2018-09-13 PROCEDURE — 99999 PR PBB SHADOW E&M-EST. PATIENT-LVL II: CPT | Mod: PBBFAC,,, | Performed by: DERMATOLOGY

## 2018-09-13 PROCEDURE — 99212 OFFICE O/P EST SF 10 MIN: CPT | Mod: PBBFAC,PO | Performed by: DERMATOLOGY

## 2018-09-13 PROCEDURE — 99213 OFFICE O/P EST LOW 20 MIN: CPT | Mod: 25,S$PBB,, | Performed by: DERMATOLOGY

## 2018-09-13 PROCEDURE — 17000 DESTRUCT PREMALG LESION: CPT | Mod: S$PBB,,, | Performed by: DERMATOLOGY

## 2018-09-13 NOTE — PATIENT INSTRUCTIONS

## 2018-09-13 NOTE — PROGRESS NOTES
Subjective:       Patient ID:  Jesse Hernandez Jr. is a 68 y.o. 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  Denies bleeding, tender, growing, or concerning lesions.           Review of Systems   Constitutional: Negative for fever and chills.   Gastrointestinal: Negative for nausea and vomiting.   Skin: Negative for daily sunscreen use, activity-related sunscreen use and recent sunburn.   Hematologic/Lymphatic: Does not bruise/bleed easily.        Objective:    Physical Exam   Constitutional: He appears well-developed and well-nourished. No distress.   Neurological: He is alert and oriented to person, place, and time. He is not disoriented.   Psychiatric: He has a normal mood and affect.   Skin:   Areas Examined (abnormalities noted in diagram):   Scalp / Hair Palpated and Inspected  Head / Face Inspection Performed  Neck Inspection Performed  Chest / Axilla Inspection Performed  Abdomen Inspection Performed  Genitals / Buttocks / Groin Inspection Performed  Back Inspection Performed  RUE Inspected  LUE Inspection Performed  RLE Inspected  LLE Inspection Performed  Nails and Digits Inspection Performed                   Diagram Legend     Erythematous scaling macule/papule c/w actinic keratosis       Vascular papule c/w angioma      Pigmented verrucoid papule/plaque c/w seborrheic keratosis      Yellow umbilicated papule c/w sebaceous hyperplasia      Irregularly shaped tan macule c/w lentigo     1-2 mm smooth white papules consistent with Milia      Movable subcutaneous cyst with punctum c/w epidermal inclusion cyst      Subcutaneous movable cyst c/w pilar cyst      Firm pink to brown papule c/w dermatofibroma      Pedunculated  fleshy papule(s) c/w skin tag(s)      Evenly pigmented macule c/w junctional nevus     Mildly variegated pigmented, slightly irregular-bordered macule c/w mildly atypical nevus      Flesh colored to evenly pigmented papule c/w intradermal nevus       Pink pearly papule/plaque c/w basal cell carcinoma      Erythematous hyperkeratotic cursted plaque c/w SCC      Surgical scar with no sign of skin cancer recurrence      Open and closed comedones      Inflammatory papules and pustules      Verrucoid papule consistent consistent with wart     Erythematous eczematous patches and plaques     Dystrophic onycholytic nail with subungual debris c/w onychomycosis     Umbilicated papule    Erythematous-base heme-crusted tan verrucoid plaque consistent with inflamed seborrheic keratosis     Erythematous Silvery Scaling Plaque c/w Psoriasis     See annotation      Assessment / Plan:        Scar conditions/skin fibrosis  Screening, malignant neoplasm, skin  History of skin cancer  Scar of the right eyebrow, left parietal scalp, hx of NMSC.  No evidence of recurrence on physical exam today.  Continue routine skin surveillance. Daily sunscreen advised.    Seborrheic keratosis  Reassurance given. Discussed diagnosis and that lesions are benign.  AAD handout given.     Actinic keratoses  Cryosurgery Procedure Note    The patient is informed of the precancerous quality and need for treatment of these lesions. After risks, benefits and alternatives explained, including blistering, pain, hyper- and hypopigmentation, patient verbally consents to cryotherapy to precancerous lesions. Liquid nitrogen cryosurgery is applied to the 6 actinic keratoses, as detailed in the physical exam, to produce a freeze injury. The patient is aware that blisters may form and is instructed on wound care with gentle cleansing and use of vaseline ointment to keep moist until healed. The patient is supplied a handout on cryosurgery and is instructed to call if  lesions do not completely resolve.           Follow-up in about 6 months (around 3/13/2019).

## 2018-09-17 ENCOUNTER — HOSPITAL ENCOUNTER (OUTPATIENT)
Dept: RADIOLOGY | Facility: HOSPITAL | Age: 68
Discharge: HOME | End: 2018-09-17
Attending: INTERNAL MEDICINE
Payer: MEDICARE

## 2018-09-17 ENCOUNTER — OFFICE VISIT (OUTPATIENT)
Dept: TRANSPLANT | Facility: CLINIC | Age: 68
End: 2018-09-17
Payer: MEDICARE

## 2018-09-17 ENCOUNTER — HOSPITAL ENCOUNTER (OUTPATIENT)
Dept: CARDIOLOGY | Facility: CLINIC | Age: 68
Discharge: HOME | End: 2018-09-17
Attending: INTERNAL MEDICINE
Payer: MEDICARE

## 2018-09-17 VITALS
HEIGHT: 70 IN | BODY MASS INDEX: 26.99 KG/M2 | WEIGHT: 188.5 LBS | SYSTOLIC BLOOD PRESSURE: 119 MMHG | DIASTOLIC BLOOD PRESSURE: 80 MMHG | HEART RATE: 105 BPM

## 2018-09-17 DIAGNOSIS — Z94.1 HEART REPLACED BY TRANSPLANT: ICD-10-CM

## 2018-09-17 DIAGNOSIS — N18.30 BENIGN HYPERTENSIVE HEART AND KIDNEY DISEASE WITH CHF, NYHA CLASS 1 AND CKD STAGE 3: ICD-10-CM

## 2018-09-17 DIAGNOSIS — I25.811 CORONARY ARTERY DISEASE INVOLVING NATIVE ARTERY OF TRANSPLANTED HEART WITHOUT ANGINA PECTORIS: ICD-10-CM

## 2018-09-17 DIAGNOSIS — Z94.1 STATUS POST HEART TRANSPLANTATION: ICD-10-CM

## 2018-09-17 DIAGNOSIS — Z94.1 HEART REPLACED BY TRANSPLANT: Primary | ICD-10-CM

## 2018-09-17 DIAGNOSIS — E78.2 MIXED HYPERLIPIDEMIA: ICD-10-CM

## 2018-09-17 DIAGNOSIS — I13.0 BENIGN HYPERTENSIVE HEART AND KIDNEY DISEASE WITH CHF, NYHA CLASS 1 AND CKD STAGE 3: ICD-10-CM

## 2018-09-17 LAB
DIASTOLIC DYSFUNCTION: NO
ESTIMATED PA SYSTOLIC PRESSURE: 25.66
MITRAL VALVE REGURGITATION: NORMAL
RETIRED EF AND QEF - SEE NOTES: 55 (ref 55–65)
TRICUSPID VALVE REGURGITATION: NORMAL

## 2018-09-17 PROCEDURE — 99999 PR PBB SHADOW E&M-EST. PATIENT-LVL IV: CPT | Mod: PBBFAC,,, | Performed by: PHYSICIAN ASSISTANT

## 2018-09-17 PROCEDURE — 93306 TTE W/DOPPLER COMPLETE: CPT | Mod: PBBFAC | Performed by: INTERNAL MEDICINE

## 2018-09-17 PROCEDURE — 99214 OFFICE O/P EST MOD 30 MIN: CPT | Mod: S$PBB,,, | Performed by: PHYSICIAN ASSISTANT

## 2018-09-17 PROCEDURE — 99214 OFFICE O/P EST MOD 30 MIN: CPT | Mod: PBBFAC,25 | Performed by: PHYSICIAN ASSISTANT

## 2018-09-17 PROCEDURE — 71046 X-RAY EXAM CHEST 2 VIEWS: CPT | Mod: 26,,, | Performed by: RADIOLOGY

## 2018-09-17 PROCEDURE — 71046 X-RAY EXAM CHEST 2 VIEWS: CPT | Mod: TC,FY

## 2018-09-17 NOTE — LETTER
September 17, 2018        Iftikhar Houston  [address] [facility] [address]  Phone: [phone]  Fax: [phone]             [facility] [address] [place] [address]  Phone: [phone]   Patient: Jesse Hernandez Jr.   MR Number: [id number]   YOB: 1950   Date of Visit: 9/17/2018       Dear Dr. Iftikhar Houston    Thank you for referring Jesse Hernandez to me for evaluation. Attached you will find relevant portions of my assessment and plan of care.    If you have questions, please do not hesitate to call me. I look forward to following Jesse Hernandez along with you.    Sincerely,    MEERA Valentin    Enclosure    If you would like to receive this communication electronically, please contact [email or web address] or [phone] to request [employer] access.    [employer] is a tool which provides read-only access to select patient information with whom you have a relationship. Its easy to use and provides real time access to review your patients record including encounter summaries, notes, results, and demographic information.    If you feel you have received this communication in error or would no longer like to receive these types of communications, please e-mail [email or web address]

## 2018-09-17 NOTE — PROGRESS NOTES
"Subjective:   Patient ID:  Jesse Hernandez Jr. is a 68 y.o. male who presents for heart transplant follow-up.    CMV Donor:   CMV Recipient:     HPI  He has PMH of OHTx 9/03, with significant CAV, HTN, stage II-III CKD, HLP who presents for 14 annual follow up. Patient underwent LHC earlier this year which demonstrated 60% RCA instent restenosis, and unchanged LAD/CMX disease. He then had PET stress to see if ischemia is an etiology of his progressive ALCANTAR/SOB symptoms. His Stress was normal. Today he presents feeling well. Had significant ALCANTAR lat year but since has improved. Has no chest pain, ALCANTAR, orthopnea. Able to do activities at home including yard work. Continues on prednisone 5 mg daily and prograf 2/3 mg. Prograf level 6.2. Triglycerides elevated which he reviewed with his son, he is working on diet.           Review of Systems   Constitution: Negative for chills, decreased appetite, fever, weakness and weight gain.   HENT: Negative for congestion.    Cardiovascular: Negative for chest pain, claudication, cyanosis, dyspnea on exertion, irregular heartbeat, orthopnea, paroxysmal nocturnal dyspnea and syncope.   Respiratory: Negative for cough and shortness of breath.    Musculoskeletal: Positive for falls. Negative for arthritis and back pain.   Gastrointestinal: Negative for nausea and vomiting.       Objective:   /80   Pulse 105   Ht 5' 10" (1.778 m)   Wt 85.5 kg (188 lb 7.9 oz)   BMI 27.05 kg/m²     Physical Exam   Constitutional: He appears well-developed and well-nourished. No distress.   HENT:   Head: Normocephalic.   Eyes: Pupils are equal, round, and reactive to light.   Neck: Normal range of motion. No JVD present.   Cardiovascular: Normal rate. Exam reveals no gallop and no friction rub.   No murmur heard.  Pulmonary/Chest: Effort normal. No respiratory distress.   Abdominal: Soft. He exhibits no distension. There is no tenderness.   Musculoskeletal: Normal range of motion. He exhibits no " edema.   Neurological: He is alert. No cranial nerve deficit.   Skin: Skin is warm. He is not diaphoretic. No erythema.         Chemistry        Component Value Date/Time     09/06/2018 0819    K 3.8 09/06/2018 0819     09/06/2018 0819    CO2 27 09/06/2018 0819    BUN 19 09/06/2018 0819    CREATININE 1.4 09/06/2018 0819     (H) 09/06/2018 0819        Component Value Date/Time    CALCIUM 9.4 09/06/2018 0819    ALKPHOS 69 09/06/2018 0819    AST 17 09/06/2018 0819    ALT 21 09/06/2018 0819    BILITOT 1.1 (H) 09/06/2018 0819    ESTGFRAFRICA 58.4 (A) 09/06/2018 0819    EGFRNONAA 50.5 (A) 09/06/2018 0819            Magnesium   Date Value Ref Range Status   09/06/2018 1.4 (L) 1.6 - 2.6 mg/dL Final       Lab Results   Component Value Date    WBC 14.62 (H) 09/06/2018    HGB 15.3 09/06/2018    HCT 46.4 09/06/2018    MCV 93 09/06/2018     (H) 09/06/2018       Lab Results   Component Value Date    INR 0.9 11/13/2015    INR 1.0 01/23/2015    INR 1.0 11/21/2014       BNP   Date Value Ref Range Status   09/06/2018 70 0 - 99 pg/mL Final     Comment:     Values of less than 100 pg/ml are consistent with non-CHF populations.   02/19/2018 48 0 - 99 pg/mL Final     Comment:     Values of less than 100 pg/ml are consistent with non-CHF populations.   12/04/2017 54 0 - 99 pg/mL Final     Comment:     Values of less than 100 pg/ml are consistent with non-CHF populations.       No results found for: LDH    Tacrolimus Lvl   Date Value Ref Range Status   09/06/2018 6.2 5.0 - 15.0 ng/mL Final     Comment:     Testing performed by Liquid Chromatography-Tandem  Mass Spectrometry (LC-MS/MS).  This test was developed and its performance characteristics  determined by [facility], Department of Pathology  and Laboratory Medicine in a manner consistent with CLIA  requirements. It has not been cleared or approved by the [place]  Food and Drug Administration.  This test is used for clinical   purposes.  It should not  be regarded as investigational or for  research.       No results found for: SIROLIMUS  No results found for: CYCLOSPORINE    Assessment:     1. Heart replaced by transplant    2. Status post heart transplantation    3. Benign hypertensive heart and kidney disease with CHF, NYHA class 1 and CKD stage 3    4. Coronary artery disease involving native artery of transplanted heart without angina pectoris    5. Mixed hyperlipidemia        Plan:   Continue on current regimen  RTC annually  Labs twice per year in [place]     Return instructions as set forth by post transplant schedule or as needed:    Clinic: Return for labs and/or biopsy weekly the first month, every two weeks during month 2 and then monthly for the first year at the provider or coordinator's discretion. During the second year, return to clinic every 3 months. Post transplant year 3-5 return every 6 months. There will be a comprehensive post transplant evaluation every year that may include LHC/RHC/biopsy, stress test, echo, CXR, and other health screening exams.    In addition to the clinical assessment, I have ordered Allomap testing for this patient to assist in identification of moderate/severe acute cellular rejection (ACR) in a pt with stable Allograft function instead of endomyocardial biopsy.     Patient is reminded to call with any health changes as these can be early signs of transplant complications. Patient is advised to make sure any new medications or changes of old medications are discussed with a pharmacist or physician knowledgeable with transplant to avoid rejection/drug toxicity related to significant drug interactions.    UNOS Patient Status  Functional Status: 80% - Normal activity with effort: some symptoms of disease  Physical Capacity: No Limitations  Working for Income: No  If no, reason not working: Patient Choice - Retired

## 2018-09-21 ENCOUNTER — TELEPHONE (OUTPATIENT)
Dept: GASTROENTEROLOGY | Facility: CLINIC | Age: 68
End: 2018-09-21

## 2018-09-21 NOTE — TELEPHONE ENCOUNTER
----- Message from Luz Aguilar sent at 9/21/2018  9:36 AM CDT -----  Regarding: FW: Needs cscope - overdue; multiple polyps; needs to have it in [place]  Hello,     Please see below. Can you work this pt in asap?     Thanks,  Luz  [id number]  ----- Message -----  From: Denita Bernnan  Sent: 9/21/2018   9:28 AM  To: [facility] Heart Transplant Schedulers  Subject: Needs cscope - overdue; multiple polyps; nee#    Can you send a referral to  GI for him to have cscope? I think there is one in the orders already. Thanks, Denita

## 2018-09-27 ENCOUNTER — PATIENT MESSAGE (OUTPATIENT)
Dept: GASTROENTEROLOGY | Facility: CLINIC | Age: 68
End: 2018-09-27

## 2018-10-02 ENCOUNTER — OFFICE VISIT (OUTPATIENT)
Dept: GASTROENTEROLOGY | Facility: CLINIC | Age: 68
End: 2018-10-02
Payer: MEDICARE

## 2018-10-02 VITALS
HEIGHT: 70 IN | SYSTOLIC BLOOD PRESSURE: 100 MMHG | WEIGHT: 190.06 LBS | DIASTOLIC BLOOD PRESSURE: 60 MMHG | HEART RATE: 102 BPM | BODY MASS INDEX: 27.21 KG/M2

## 2018-10-02 DIAGNOSIS — Z86.010 HISTORY OF COLON POLYPS: ICD-10-CM

## 2018-10-02 DIAGNOSIS — Z12.11 COLON CANCER SCREENING: Primary | ICD-10-CM

## 2018-10-02 PROCEDURE — 99499 UNLISTED E&M SERVICE: CPT | Mod: S$PBB,,, | Performed by: NURSE PRACTITIONER

## 2018-10-02 PROCEDURE — 99213 OFFICE O/P EST LOW 20 MIN: CPT | Mod: PBBFAC,PO | Performed by: NURSE PRACTITIONER

## 2018-10-02 PROCEDURE — 99999 PR PBB SHADOW E&M-EST. PATIENT-LVL III: CPT | Mod: PBBFAC,,, | Performed by: NURSE PRACTITIONER

## 2018-10-02 RX ORDER — SODIUM, POTASSIUM,MAG SULFATES 17.5-3.13G
SOLUTION, RECONSTITUTED, ORAL ORAL
Qty: 354 ML | Refills: 0 | Status: ON HOLD | OUTPATIENT
Start: 2018-10-02 | End: 2018-11-26 | Stop reason: HOSPADM

## 2018-10-02 NOTE — PROGRESS NOTES
Clinic Consult:  [facility] Consultation Note    Reason for Consult:  The primary encounter diagnosis was Colon cancer screening. A diagnosis of History of colon polyps was also pertinent to this visit.    PCP: Nathan Duncan Jr   No address on file    HPI:  This is a 68 y.o. male here for evaluation of the above. He is due for repeat colonoscopy. Had colonoscopy in 2016 for screening. Multiple polyps and recommended repeat in 1 year. No current GI complaints. No abdominal pain, hematochezia, melena, nausea, vomiting, or weight loss. No family history of colon cancer.     Review of Systems   Constitutional: Negative for fever, malaise/fatigue and weight loss.   HENT: Negative for sore throat.    Respiratory: Negative for cough and wheezing.    Cardiovascular: Negative for chest pain and palpitations.   Gastrointestinal: Negative for abdominal pain, blood in stool, constipation, diarrhea, heartburn, melena, nausea and vomiting.   Genitourinary: Negative for dysuria and frequency.   Musculoskeletal: Negative for back pain, joint pain, myalgias and neck pain.   Skin: Negative for itching and rash.   Neurological: Negative for dizziness, speech change, seizures, loss of consciousness and headaches.   Psychiatric/Behavioral: Negative for depression and substance abuse. The patient is not nervous/anxious.        Medical History:  has a past medical history of CHF (congestive heart failure), Coronary artery disease, Heart attack, Hyperlipidemia, Hypertension, Osteoarthritis of hip (7/25/2016), Squamous cell carcinoma (4/2015), and Tubular adenoma of colon (10/10/2012).    Surgical History:  has a past surgical history that includes Heart transplant; Hemorrhoid surgery; Parathyroid gland surgery; Tonsillectomy; Cardiac catheterization; Skin biopsy; Heart transplant; HEART CATH-LEFT (Left, 2/23/2018); HEART CATH-LEFT (Left, 1/13/2017); STENT-CORONARY (N/A, 1/13/2017); COLONOSCOPY (N/A, 9/2/2016); HEART  CATH-LEFT (Left, 11/13/2015); STENT-CORONARY (N/A, 1/23/2015); HEART CATH-LEFT (Left, 11/21/2014); and CATHETERIZATION, HEART, LEFT (Left, 11/22/2013).    Family History: family history includes Cancer in his sister; Heart attack in his brother, father, maternal grandfather, maternal grandmother, mother, and sister; Heart failure in his brother, father, maternal grandfather, maternal grandmother, and sister; Hypertension in his father and mother..     Social History:  reports that he quit smoking about 2 years ago. His smoking use included pipe. he has never used smokeless tobacco. He reports that he drinks alcohol. He reports that he does not use drugs.    Allergies: Reviewed    Home Medications:   Current Outpatient Medications on File Prior to Visit   Medication Sig Dispense Refill    amLODIPine (NORVASC) 5 MG tablet TAKE 2 TABLETS EVERY MORNING AND 1 TABLET EVERY EVENING DAILY 90 tablet 11    aspirin (ECOTRIN) 81 MG EC tablet Take 81 mg by mouth every evening.       atorvastatin (LIPITOR) 40 MG tablet TAKE (2) TABLETS DAILY. 60 tablet 11    clopidogrel (PLAVIX) 75 mg tablet TAKE ONE TABLET DAILY 30 tablet 11    ezetimibe (ZETIA) 10 mg tablet TAKE 1 TABLET ONCE DAILY IN THE EVENING 30 tablet 11    finasteride (PROSCAR) 5 mg tablet Take 1 tablet (5 mg total) by mouth once daily. 30 tablet 11    folic acid-vit B6-vit B12 (FOLTX) 2.5-25-2 mg tablet Take 1 tablet by mouth once daily.        furosemide (LASIX) 20 MG tablet TAKE (1) TABLET DAILY. 30 tablet 11    hydrALAZINE (APRESOLINE) 25 MG tablet TAKE  (1)  TABLET  THREE TIMES DAILY. 90 tablet 11    metFORMIN (GLUCOPHAGE-XR) 500 MG 24 hr tablet 2 (two) times daily with meals.       omega-3 acid ethyl esters (LOVAZA) 1 gram capsule Take 2 capsules (2 g total) by mouth 2 (two) times daily. 360 capsule 3    predniSONE (DELTASONE) 5 MG tablet TAKE (1) TABLET DAILY. 30 tablet 11    ranitidine (ZANTAC) 150 MG tablet Take 1 tablet (150 mg total) by mouth once  "daily. 90 tablet 3    tacrolimus (PROGRAF) 1 MG Cap TAKE 2 CAPSULES BY MOUTH IN THE MORNING AND TAKE 3 CAPSULES IN THE EVENING 450 capsule 0    tamsulosin (FLOMAX) 0.4 mg Cap Take 1 capsule (0.4 mg total) by mouth once daily. 30 capsule 11    ZETIA 10 mg tablet TAKE 1 TABLET ONCE DAILY IN THE EVENING 30 tablet 11    [DISCONTINUED] fluorouracil (EFUDEX) 5 % cream AAA bid x 4 weeks to biopsy site of scalp 40 g 1     No current facility-administered medications on file prior to visit.        Physical Exam:  /60   Pulse 102   Ht 5' 10" (1.778 m)   Wt 86.2 kg (190 lb 0.6 oz)   BMI 27.27 kg/m²   Body mass index is 27.27 kg/m².  Physical Exam   Constitutional: He is oriented to person, place, and time and well-developed, well-nourished, and in no distress. No distress.   HENT:   Head: Normocephalic.   Eyes: Conjunctivae are normal. Pupils are equal, round, and reactive to light.   Cardiovascular: Normal rate, regular rhythm and normal heart sounds.   Pulmonary/Chest: Effort normal and breath sounds normal. No respiratory distress.   Abdominal: Soft. Bowel sounds are normal. He exhibits no distension. There is no tenderness.   Neurological: He is alert and oriented to person, place, and time. No cranial nerve deficit.   Skin: Skin is warm and dry. No rash noted.   Psychiatric: Mood and affect normal.       Labs: Pertinent labs reviewed.   CRC Screening: overdue    Assessment:  1. Colon cancer screening    2. History of colon polyps         Recommendations:  - due for repeat screening colonoscopy  -     Case request GI: COLONOSCOPY  -     SUPREP BOWEL PREP KIT 17.5-3.13-1.6 gram SolR; Use as directed  Dispense: 354 mL; Refill: 0    Follow up to be determined after procedure.    Thank you so much for allowing me to participate in the care of Jesse Hernandez Jr.  VAN Rader  "

## 2018-10-04 RX ORDER — PREDNISONE 5 MG/1
TABLET ORAL
Qty: 30 TABLET | Refills: 11 | Status: SHIPPED | OUTPATIENT
Start: 2018-10-04 | End: 2018-10-16 | Stop reason: SDUPTHER

## 2018-10-04 RX ORDER — HYDRALAZINE HYDROCHLORIDE 25 MG/1
TABLET, FILM COATED ORAL
Qty: 90 TABLET | Refills: 11 | Status: SHIPPED | OUTPATIENT
Start: 2018-10-04 | End: 2018-10-16 | Stop reason: SDUPTHER

## 2018-10-04 RX ORDER — EZETIMIBE 10 MG/1
TABLET ORAL
Qty: 30 TABLET | Refills: 11 | Status: SHIPPED | OUTPATIENT
Start: 2018-10-04 | End: 2018-10-16 | Stop reason: SDUPTHER

## 2018-10-04 RX ORDER — FUROSEMIDE 20 MG/1
TABLET ORAL
Qty: 30 TABLET | Refills: 11 | Status: SHIPPED | OUTPATIENT
Start: 2018-10-04 | End: 2018-10-18 | Stop reason: SDUPTHER

## 2018-10-04 RX ORDER — CLOPIDOGREL BISULFATE 75 MG/1
TABLET ORAL
Qty: 30 TABLET | Refills: 11 | Status: SHIPPED | OUTPATIENT
Start: 2018-10-04 | End: 2018-10-16 | Stop reason: SDUPTHER

## 2018-10-16 RX ORDER — HYDRALAZINE HYDROCHLORIDE 25 MG/1
TABLET, FILM COATED ORAL
Qty: 90 TABLET | Refills: 11 | Status: SHIPPED | OUTPATIENT
Start: 2018-10-16 | End: 2019-10-02 | Stop reason: SDUPTHER

## 2018-10-16 RX ORDER — CLOPIDOGREL BISULFATE 75 MG/1
TABLET ORAL
Qty: 30 TABLET | Refills: 11 | Status: SHIPPED | OUTPATIENT
Start: 2018-10-16 | End: 2019-10-02 | Stop reason: SDUPTHER

## 2018-10-16 RX ORDER — PREDNISONE 5 MG/1
TABLET ORAL
Qty: 30 TABLET | Refills: 11 | Status: SHIPPED | OUTPATIENT
Start: 2018-10-16 | End: 2019-10-02 | Stop reason: SDUPTHER

## 2018-10-16 RX ORDER — EZETIMIBE 10 MG/1
TABLET ORAL
Qty: 30 TABLET | Refills: 11 | Status: SHIPPED | OUTPATIENT
Start: 2018-10-16

## 2018-10-18 RX ORDER — FUROSEMIDE 20 MG/1
TABLET ORAL
Qty: 30 TABLET | Refills: 11 | Status: SHIPPED | OUTPATIENT
Start: 2018-10-18 | End: 2019-10-02 | Stop reason: SDUPTHER

## 2018-11-05 RX ORDER — ATORVASTATIN CALCIUM 40 MG/1
TABLET, FILM COATED ORAL
Qty: 60 TABLET | Refills: 11 | Status: SHIPPED | OUTPATIENT
Start: 2018-11-05 | End: 2019-11-03 | Stop reason: SDUPTHER

## 2018-11-26 ENCOUNTER — ANESTHESIA (OUTPATIENT)
Dept: ENDOSCOPY | Facility: HOSPITAL | Age: 68
End: 2018-11-26
Payer: MEDICARE

## 2018-11-26 ENCOUNTER — ANESTHESIA EVENT (OUTPATIENT)
Dept: ENDOSCOPY | Facility: HOSPITAL | Age: 68
End: 2018-11-26
Payer: MEDICARE

## 2018-11-26 ENCOUNTER — HOSPITAL ENCOUNTER (OUTPATIENT)
Facility: HOSPITAL | Age: 68
Discharge: HOME | End: 2018-11-26
Attending: INTERNAL MEDICINE | Admitting: INTERNAL MEDICINE
Payer: MEDICARE

## 2018-11-26 VITALS
TEMPERATURE: 98 F | RESPIRATION RATE: 20 BRPM | OXYGEN SATURATION: 96 % | HEART RATE: 93 BPM | DIASTOLIC BLOOD PRESSURE: 93 MMHG | WEIGHT: 179 LBS | HEIGHT: 70 IN | SYSTOLIC BLOOD PRESSURE: 135 MMHG | BODY MASS INDEX: 25.62 KG/M2

## 2018-11-26 DIAGNOSIS — Z86.010 HISTORY OF COLON POLYPS: ICD-10-CM

## 2018-11-26 LAB — POCT GLUCOSE: 123 MG/DL (ref 70–110)

## 2018-11-26 PROCEDURE — 27201089 HC SNARE, DISP (ANY): Performed by: INTERNAL MEDICINE

## 2018-11-26 PROCEDURE — 88305 TISSUE EXAM BY PATHOLOGIST: CPT | Performed by: PATHOLOGY

## 2018-11-26 PROCEDURE — 63600175 PHARM REV CODE 636 W HCPCS: Performed by: NURSE ANESTHETIST, CERTIFIED REGISTERED

## 2018-11-26 PROCEDURE — 45385 COLONOSCOPY W/LESION REMOVAL: CPT | Mod: PT,,, | Performed by: INTERNAL MEDICINE

## 2018-11-26 PROCEDURE — 25000003 PHARM REV CODE 250: Performed by: INTERNAL MEDICINE

## 2018-11-26 PROCEDURE — 37000009 HC ANESTHESIA EA ADD 15 MINS: Performed by: INTERNAL MEDICINE

## 2018-11-26 PROCEDURE — 45380 COLONOSCOPY AND BIOPSY: CPT | Mod: 59,,, | Performed by: INTERNAL MEDICINE

## 2018-11-26 PROCEDURE — 88305 TISSUE EXAM BY PATHOLOGIST: CPT | Mod: 26,,, | Performed by: PATHOLOGY

## 2018-11-26 PROCEDURE — 45385 COLONOSCOPY W/LESION REMOVAL: CPT | Performed by: INTERNAL MEDICINE

## 2018-11-26 PROCEDURE — 27201012 HC FORCEPS, HOT/COLD, DISP: Performed by: INTERNAL MEDICINE

## 2018-11-26 PROCEDURE — 45380 COLONOSCOPY AND BIOPSY: CPT | Performed by: INTERNAL MEDICINE

## 2018-11-26 PROCEDURE — 37000008 HC ANESTHESIA 1ST 15 MINUTES: Performed by: INTERNAL MEDICINE

## 2018-11-26 RX ORDER — SODIUM CHLORIDE 0.9 % (FLUSH) 0.9 %
3 SYRINGE (ML) INJECTION
Status: DISCONTINUED | OUTPATIENT
Start: 2018-11-26 | End: 2018-11-26 | Stop reason: HOSPADM

## 2018-11-26 RX ORDER — PROPOFOL 10 MG/ML
INJECTION, EMULSION INTRAVENOUS
Status: DISCONTINUED | OUTPATIENT
Start: 2018-11-26 | End: 2018-11-26

## 2018-11-26 RX ORDER — LIDOCAINE HCL/PF 100 MG/5ML
SYRINGE (ML) INTRAVENOUS
Status: DISCONTINUED | OUTPATIENT
Start: 2018-11-26 | End: 2018-11-26

## 2018-11-26 RX ORDER — SODIUM CHLORIDE, SODIUM LACTATE, POTASSIUM CHLORIDE, CALCIUM CHLORIDE 600; 310; 30; 20 MG/100ML; MG/100ML; MG/100ML; MG/100ML
INJECTION, SOLUTION INTRAVENOUS CONTINUOUS
Status: DISCONTINUED | OUTPATIENT
Start: 2018-11-26 | End: 2018-11-26 | Stop reason: HOSPADM

## 2018-11-26 RX ADMIN — PROPOFOL 30 MG: 10 INJECTION, EMULSION INTRAVENOUS at 02:11

## 2018-11-26 RX ADMIN — PROPOFOL 30 MG: 10 INJECTION, EMULSION INTRAVENOUS at 01:11

## 2018-11-26 RX ADMIN — PROPOFOL 40 MG: 10 INJECTION, EMULSION INTRAVENOUS at 02:11

## 2018-11-26 RX ADMIN — LIDOCAINE HYDROCHLORIDE 100 MG: 20 INJECTION, SOLUTION INTRAVENOUS at 01:11

## 2018-11-26 RX ADMIN — PROPOFOL 40 MG: 10 INJECTION, EMULSION INTRAVENOUS at 01:11

## 2018-11-26 RX ADMIN — PROPOFOL 140 MG: 10 INJECTION, EMULSION INTRAVENOUS at 01:11

## 2018-11-26 RX ADMIN — SODIUM CHLORIDE, SODIUM LACTATE, POTASSIUM CHLORIDE, AND CALCIUM CHLORIDE: 600; 310; 30; 20 INJECTION, SOLUTION INTRAVENOUS at 01:11

## 2018-11-26 NOTE — ANESTHESIA RELEASE NOTE
"Anesthesia Release from PACU Note    Patient: Jesse Hernandez Jr.    Procedure(s) Performed: Procedure(s) (LRB):  COLONOSCOPY (N/A)    Anesthesia type: MAC    Post pain: Adequate analgesia    Post assessment: no apparent anesthetic complications, tolerated procedure well and no evidence of recall    Last Vitals:   Visit Vitals  BP 95/62 (BP Location: Left arm, Patient Position: Lying)   Pulse 105   Temp 36.6 °C (97.8 °F) (Oral)   Resp 17   Ht 5' 10" (1.778 m)   Wt 81.2 kg (179 lb)   SpO2 (!) 93%   BMI 25.68 kg/m²       Post vital signs: stable    Level of consciousness: awake and alert     Nausea/Vomiting: no nausea/no vomiting    Complications: none    Airway Patency: patent    Respiratory: unassisted, spontaneous ventilation, room air    Cardiovascular: stable    Hydration: euvolemic  "

## 2018-11-26 NOTE — ANESTHESIA POSTPROCEDURE EVALUATION
"Anesthesia Post Evaluation    Patient: Jesse Hernandez Jr.    Procedure(s) Performed: Procedure(s) (LRB):  COLONOSCOPY (N/A)    Final Anesthesia Type: MAC  Patient location during evaluation: PACU  Patient participation: Yes- Able to Participate  Level of consciousness: awake and alert and oriented  Post-procedure vital signs: reviewed and stable  Pain management: adequate  Airway patency: patent  PONV status at discharge: No PONV  Anesthetic complications: no      Cardiovascular status: blood pressure returned to baseline  Respiratory status: unassisted, room air and spontaneous ventilation  Hydration status: euvolemic  Follow-up not needed.        Visit Vitals  BP 95/62 (BP Location: Left arm, Patient Position: Lying)   Pulse 105   Temp 36.6 °C (97.8 °F) (Oral)   Resp 17   Ht 5' 10" (1.778 m)   Wt 81.2 kg (179 lb)   SpO2 (!) 93%   BMI 25.68 kg/m²       Pain/Emi Score: Pain Assessment Performed: Yes (11/26/2018 12:28 PM)  Presence of Pain: denies (11/26/2018  2:26 PM)  Emi Score: 8 (11/26/2018  2:30 PM)        "

## 2018-11-26 NOTE — ANESTHESIA PREPROCEDURE EVALUATION
11/26/2018  Jesse Hernandez Jr. is a 68 y.o., male.    Pre-op Assessment    I have reviewed the Patient Summary Reports.     I have reviewed the Nursing Notes.   I have reviewed the Medications.     Review of Systems  Anesthesia Hx:  No problems with previous Anesthesia  Denies Family Hx of Anesthesia complications.   Denies Personal Hx of Anesthesia complications.   Social:  Former Smoker    Hematology/Oncology:  Hematology Normal   Oncology Normal     EENT/Dental:EENT/Dental Normal   Cardiovascular:   Denies Pacemaker. (Heart transplant 2003) Hypertension, well controlled Past MI CAD  asymptomatic      CONCLUSIONS     1 - S/P OHTX    9/20/2003.     2 - Normal left ventricular systolic function (EF 55-60%).     3 - Normal right ventricular systolic function .     4 - Normal left ventricular diastolic function.     5 - Mild tricuspid regurgitation.     6 - The estimated PA systolic pressure is 26 mmHg.    Pulmonary:  Pulmonary Normal  Denies COPD.  Denies Asthma.    Renal/:   Chronic Renal Disease (stage II), CRI    Musculoskeletal:   Arthritis     Neurological:  Neurology Normal  Denies CVA. Denies Seizures.    Endocrine:  Endocrine Normal Denies Diabetes.    Dermatological:  Skin Normal    Psych:  Psychiatric Normal           Physical Exam  General:  Well nourished    Airway/Jaw/Neck:  Airway Findings: Mouth Opening: Normal Tongue: Normal  General Airway Assessment: Adult  Mallampati: II  TM Distance: Normal, at least 6 cm       Chest/Lungs:  Chest/Lungs Findings: Clear to auscultation     Heart/Vascular:  Heart Findings: Rate: Normal             Anesthesia Plan  Type of Anesthesia, risks & benefits discussed:  Anesthesia Type:  MAC  Patient's Preference:   Intra-op Monitoring Plan:   Intra-op Monitoring Plan Comments:   Post Op Pain Control Plan:   Post Op Pain Control Plan Comments:   Induction:   IV  Beta  Blocker:  Patient is not currently on a Beta-Blocker (No further documentation required).       Informed Consent: Patient understands risks and agrees with Anesthesia plan.  Questions answered. Anesthesia consent signed with patient.  ASA Score: 3     Day of Surgery Review of History & Physical: I have interviewed and examined the patient. I have reviewed the patient's H&P dated:  There are no significant changes.          Ready For Surgery From Anesthesia Perspective.

## 2018-11-26 NOTE — [employer] PATIENT INSTRUCTIONS
Discharge Summary/Instructions after an Endoscopic Procedure  Patient Name: Jesse Hernandez  Patient MRN: [id number]  Patient YOB: 1950 Monday, November 26, 2018 Elodia Major MD  RESTRICTIONS:  During your procedure today, you received medications for sedation.  These   medications may affect your judgment, balance and coordination.  Therefore,   for 24 hours, you have the following restrictions:   - DO NOT drive a car, operate machinery, make legal/financial decisions,   sign important papers or drink alcohol.    ACTIVITY:  Today: no heavy lifting, straining or running due to procedural   sedation/anesthesia.  The following day: return to full activity including work.  DIET:  Eat and drink normally unless instructed otherwise.     TREATMENT FOR COMMON SIDE EFFECTS:  - Mild abdominal pain, nausea, belching, bloating or excessive gas:  rest,   eat lightly and use a heating pad.  - Sore Throat: treat with throat lozenges and/or gargle with warm salt   water.  - Because air was used during the procedure, expelling large amounts of air   from your rectum or belching is normal.  - If a bowel prep was taken, you may not have a bowel movement for 1-3 days.    This is normal.  SYMPTOMS TO WATCH FOR AND REPORT TO YOUR PHYSICIAN:  1. Abdominal pain or bloating, other than gas cramps.  2. Chest pain.  3. Back pain.  4. Signs of infection such as: chills or fever occurring within 24 hours   after the procedure.  5. Rectal bleeding, which would show as bright red, maroon, or black stools.   (A tablespoon of blood from the rectum is not serious, especially if   hemorrhoids are present.)  6. Vomiting.  7. Weakness or dizziness.  GO DIRECTLY TO THE NEAREST EMERGENCY ROOM IF YOU HAVE ANY OF THE FOLLOWING:      Difficulty breathing              Chills and/or fever over 101 F   Persistent vomiting and/or vomiting blood   Severe abdominal pain   Severe chest pain   Black, tarry stools   Bleeding- more than one tablespoon   Any other  symptom or condition that you feel may need urgent attention  Your doctor recommends these additional instructions:  If any biopsies were taken, your doctors clinic will contact you in 1 to 2   weeks with any results.  - Patient has a contact number available for emergencies.  The signs and   symptoms of potential delayed complications were discussed with the   patient.  Return to normal activities tomorrow.  Written discharge   instructions were provided to the patient.   - Resume previous diet.   - Continue present medications.   - Await pathology results.   - Repeat colonoscopy in 3 years for surveillance.   - Return to referring physician PRN.   - Resume aspirin in 5 days and Plavix (clopidogrel) in 7 days at prior   doses.   - Discharge patient to home.  For questions, problems or results please call your physician Elodia Major MD at Work:  [phone]  If you have any questions about the above instructions, call the GI   department at [phone] or call the endoscopy unit at [phone]   from 7am until 3 pm.  [facility] [place], EMERGENCY ROOM PHONE NUMBER:   [phone]  IF A COMPLICATION OR EMERGENCY SITUATION ARISES AND YOU ARE UNABLE TO REACH   YOUR PHYSICIAN - GO DIRECTLY TO THE EMERGENCY ROOM.  I have read or have had read to me these discharge instructions for my   procedure and have received a written copy.  I understand these   instructions and will follow-up with my physician if I have any questions.     __________________________________       _____________________________________  Nurse Signature                                          Patient/Designated   Responsible Party Signature  Elodia Major MD  11/26/2018 2:47:28 PM  This report has been verified and signed electronically.  [employer]

## 2018-11-26 NOTE — DISCHARGE INSTRUCTIONS
Colonoscopy     A camera attached to a flexible tube with a viewing lens is used to take video pictures.     Colonoscopy is a test to view the inside of your lower digestive tract (colon and rectum). Sometimes it can show the last part of the small intestine (ileum). During the test, small pieces of tissue may be removed for testing. This is called a biopsy. Small growths, such as polyps, may also be removed.   Why is colonoscopy done?  The test is done to help look for colon cancer. And it can help find the source of abdominal pain, bleeding, and changes in bowel habits. It may be needed once a year, depending on factors such as your:  · Age  · Health history  · Family health history  · Symptoms  · Results from any prior colonoscopy  Risks and possible complications  These include:  · Bleeding               · A puncture or tear in the colon   · Risks of anesthesia  · A cancer lesion not being seen  Getting ready   To prepare for the test:  · Talk with your healthcare provider about the risks of the test (see below). Also ask your healthcare provider about alternatives to the test.  · Tell your healthcare provider about any medicines you take. Also tell him or her about any health conditions you may have.  · Make sure your rectum and colon are empty for the test. Follow the diet and bowel prep instructions exactly. If you dont, the test may need to be rescheduled.  · Plan for a friend or family member to drive you home after the test.     Colonoscopy provides an inside view of the entire colon.     You may discuss the results with your doctor right away or at a future visit.  During the test   The test is usually done in the hospital on an outpatient basis. This means you go home the same day. The procedure takes about 30 minutes. During that time:  · You are given relaxing (sedating) medicine through an IV line. You may be drowsy, or fully asleep.  · The healthcare provider will first give you a physical exam to  check for anal and rectal problems.  · Then the anus is lubricated and the scope inserted.  · If you are awake, you may have a feeling similar to needing to have a bowel movement. You may also feel pressure as air is pumped into the colon. Its OK to pass gas during the procedure.  · Biopsy, polyp removal, or other treatments may be done during the test.  After the test   You may have gas right after the test. It can help to try to pass it to help prevent later bloating. Your healthcare provider may discuss the results with you right away. Or you may need to schedule a follow-up visit to talk about the results. After the test, you can go back to your normal eating and other activities. You may be tired from the sedation and need to rest for a few hours.  Date Last Reviewed: 11/1/2016 © 2000-2017 [employer]. [address] [place] [address], [place] [address]. All rights reserved. This information is not intended as a substitute for professional medical care. Always follow your healthcare professional's instructions.

## 2018-11-26 NOTE — TRANSFER OF CARE
"Anesthesia Transfer of Care Note    Patient: Jesse Hernandez Jr.    Procedure(s) Performed: Procedure(s) (LRB):  COLONOSCOPY (N/A)    Patient location: PACU    Anesthesia Type: MAC    Transport from OR: Transported from OR on room air with adequate spontaneous ventilation    Post pain: adequate analgesia    Post assessment: no apparent anesthetic complications    Post vital signs: stable    Level of consciousness: sedated    Nausea/Vomiting: no nausea/vomiting    Complications: none    Transfer of care protocol was followed      Last vitals:   Visit Vitals  BP 95/62 (BP Location: Left arm, Patient Position: Lying)   Pulse 105   Temp 36.6 °C (97.8 °F) (Oral)   Resp 17   Ht 5' 10" (1.778 m)   Wt 81.2 kg (179 lb)   SpO2 (!) 93%   BMI 25.68 kg/m²     "

## 2018-11-26 NOTE — H&P
Short Stay Endoscopy History and Physical    PCP - Nathan Duncan Jr, MD    Procedure - Colonoscopy    H/O colon polyp(s), denies any acute issues. Needs surveillance colonoscopy.    ROS:  Constitutional: No fevers, chills, No weight loss  ENT: No allergies  CV: No chest pain  Pulm: No cough, No shortness of breath  Ophtho: No vision changes  GI: see HPI  Derm: No rash  Heme: No lymphadenopathy, No bruising  MSK: No arthritis  : No dysuria, No hematuria  Endo: No hot or cold intolerance  Neuro: No syncope, No seizure  Psych: No anxiety, No depression    Medical History:  has a past medical history of CHF (congestive heart failure), Coronary artery disease, Heart attack, Hyperlipidemia, Hypertension, Osteoarthritis of hip (7/25/2016), Squamous cell carcinoma (4/2015), and Tubular adenoma of colon (10/10/2012).    Surgical History:  has a past surgical history that includes Heart transplant; Hemorrhoid surgery; Parathyroid gland surgery; Tonsillectomy; Cardiac catheterization; Skin biopsy; Heart transplant; HEART CATH-LEFT (Left, 2/23/2018); HEART CATH-LEFT (Left, 1/13/2017); STENT-CORONARY (N/A, 1/13/2017); COLONOSCOPY (N/A, 9/2/2016); HEART CATH-LEFT (Left, 11/13/2015); STENT-CORONARY (N/A, 1/23/2015); HEART CATH-LEFT (Left, 11/21/2014); and CATHETERIZATION, HEART, LEFT (Left, 11/22/2013).    Family History: family history includes Cancer in his sister; Heart attack in his brother, father, maternal grandfather, maternal grandmother, mother, and sister; Heart failure in his brother, father, maternal grandfather, maternal grandmother, and sister; Hypertension in his father and mother.. Otherwise no colon cancer, inflammatory bowel disease, or GI malignancies.    Social History:  reports that he quit smoking about 2 years ago. His smoking use included pipe. he has never used smokeless tobacco. He reports that he does not drink alcohol or use drugs.    Review of patient's allergies indicates:   Allergen  Reactions    Shellfish containing products      Soft shell crab only       Medications:   Medications Prior to Admission   Medication Sig Dispense Refill Last Dose    amLODIPine (NORVASC) 5 MG tablet TAKE 2 TABLETS EVERY MORNING AND 1 TABLET EVERY EVENING DAILY 90 tablet 11 11/25/2018 at 1900    aspirin (ECOTRIN) 81 MG EC tablet Take 81 mg by mouth every evening.    11/25/2018 at 1900    atorvastatin (LIPITOR) 40 MG tablet TAKE (2) TABLETS DAILY. 60 tablet 11 11/25/2018 at 1900    ezetimibe (ZETIA) 10 mg tablet TAKE 1 TABLET ONCE DAILY IN THE EVENING 30 tablet 11 11/25/2018 at 1900    finasteride (PROSCAR) 5 mg tablet Take 1 tablet (5 mg total) by mouth once daily. 30 tablet 11 11/25/2018 at 1900    folic acid-vit B6-vit B12 (FOLTX) 2.5-25-2 mg tablet Take 1 tablet by mouth once daily.     11/25/2018 at 0700    furosemide (LASIX) 20 MG tablet TAKE (1) TABLET DAILY. 30 tablet 11 11/25/2018 at 0700    hydrALAZINE (APRESOLINE) 25 MG tablet TAKE  (1)  TABLET  THREE TIMES DAILY. 90 tablet 11 11/25/2018 at 1900    metFORMIN (GLUCOPHAGE-XR) 500 MG 24 hr tablet 2 (two) times daily with meals.    11/25/2018 at 1900    omega-3 acid ethyl esters (LOVAZA) 1 gram capsule Take 2 capsules (2 g total) by mouth 2 (two) times daily. 360 capsule 3 11/25/2018 at 1900    predniSONE (DELTASONE) 5 MG tablet TAKE (1) TABLET DAILY. 30 tablet 11 11/25/2018 at 0700    ranitidine (ZANTAC) 150 MG tablet Take 1 tablet (150 mg total) by mouth once daily. 90 tablet 3 11/25/2018 at 0700    tacrolimus (PROGRAF) 1 MG Cap TAKE 2 CAPSULES BY MOUTH IN THE MORNING AND TAKE 3 CAPSULES IN THE EVENING 450 capsule 0 11/25/2018 at 1900    tamsulosin (FLOMAX) 0.4 mg Cap Take 1 capsule (0.4 mg total) by mouth once daily. 30 capsule 11 11/25/2018 at 0700    clopidogrel (PLAVIX) 75 mg tablet TAKE ONE TABLET DAILY 30 tablet 11 11/22/2018 at 0700    SUPREP BOWEL PREP KIT 17.5-3.13-1.6 gram SolR Use as directed 354 mL 0     ZETIA 10 mg tablet TAKE  1 TABLET ONCE DAILY IN THE EVENING 30 tablet 11 Taking       Objective Findings:    Vital Signs:   Vitals:    11/26/18 1243   BP: 135/85   Pulse: 96   Resp: 20   Temp: 97.8 °F (36.6 °C)         Physical Exam:  General Appearance: Well appearing in no acute distress  Eyes:    No scleral icterus  ENT: Neck supple, Lips, mucosa, and tongue normal; teeth and gums normal  Lungs: CTA bilaterally in anterior and posterior fields, no wheezes, no crackles.  Heart:  Regular rate, S1, S2 normal, no murmurs heard.  Abdomen: Soft, non tender, non distended with normal bowel sounds. No hepatosplenomegaly, ascites, or mass.  Extremities: No clubbing, cyanosis or edema  Skin: No rash    Labs:  Lab Results   Component Value Date    WBC 14.62 (H) 09/06/2018    HGB 15.3 09/06/2018    HCT 46.4 09/06/2018     (H) 09/06/2018    CHOL 131 09/06/2018    TRIG 286 (H) 09/06/2018    HDL 35 (L) 09/06/2018    ALT 21 09/06/2018    AST 17 09/06/2018     09/06/2018    K 3.8 09/06/2018     09/06/2018    CREATININE 1.4 09/06/2018    BUN 19 09/06/2018    CO2 27 09/06/2018    TSH 3.742 12/15/2016    PSA 1.6 08/27/2018    INR 0.9 11/13/2015    HGBA1C 7.6 (H) 09/06/2018       I have explained the risks and benefits of endoscopy procedures to the patient including but not limited to bleeding, perforation, infection, and death.    Proceed with colonoscopy for h/o colon polyp(s).

## 2018-12-03 ENCOUNTER — PATIENT MESSAGE (OUTPATIENT)
Dept: GASTROENTEROLOGY | Facility: CLINIC | Age: 68
End: 2018-12-03

## 2019-01-11 ENCOUNTER — TELEPHONE (OUTPATIENT)
Dept: TRANSPLANT | Facility: CLINIC | Age: 69
End: 2019-01-11

## 2019-01-11 NOTE — TELEPHONE ENCOUNTER
Called pt to check on him. Doing well. Told him I added lab to his PSA on 2/27/19 at [facility]. Reminded him not to take Prograf dose until after lab.

## 2019-02-27 ENCOUNTER — LAB VISIT (OUTPATIENT)
Dept: LAB | Facility: HOSPITAL | Age: 69
End: 2019-02-27
Attending: UROLOGY
Payer: MEDICARE

## 2019-02-27 DIAGNOSIS — N40.0 BENIGN PROSTATIC HYPERPLASIA, UNSPECIFIED WHETHER LOWER URINARY TRACT SYMPTOMS PRESENT: ICD-10-CM

## 2019-02-27 DIAGNOSIS — Z12.5 PROSTATE CANCER SCREENING: ICD-10-CM

## 2019-02-27 DIAGNOSIS — Z79.899 ENCOUNTER FOR LONG-TERM (CURRENT) USE OF MEDICATIONS: ICD-10-CM

## 2019-02-27 DIAGNOSIS — E78.2 MIXED HYPERLIPIDEMIA: ICD-10-CM

## 2019-02-27 DIAGNOSIS — Z94.1 STATUS POST HEART TRANSPLANT: ICD-10-CM

## 2019-02-27 DIAGNOSIS — T86.290 CARDIAC ALLOGRAFT VASCULOPATHY: ICD-10-CM

## 2019-02-27 LAB
ALBUMIN SERPL BCP-MCNC: 3.7 G/DL
ALP SERPL-CCNC: 72 U/L
ALT SERPL W/O P-5'-P-CCNC: 27 U/L
ANION GAP SERPL CALC-SCNC: 9 MMOL/L
AST SERPL-CCNC: 18 U/L
BASOPHILS # BLD AUTO: 0.06 K/UL
BASOPHILS NFR BLD: 0.4 %
BILIRUB SERPL-MCNC: 0.8 MG/DL
BNP SERPL-MCNC: 67 PG/ML
BUN SERPL-MCNC: 23 MG/DL
CALCIUM SERPL-MCNC: 9.9 MG/DL
CHLORIDE SERPL-SCNC: 100 MMOL/L
CHOLEST SERPL-MCNC: 147 MG/DL
CHOLEST/HDLC SERPL: 3.6 {RATIO}
CO2 SERPL-SCNC: 27 MMOL/L
COMPLEXED PSA SERPL-MCNC: 1.2 NG/ML
CREAT SERPL-MCNC: 1.2 MG/DL
DIFFERENTIAL METHOD: ABNORMAL
EOSINOPHIL # BLD AUTO: 0.1 K/UL
EOSINOPHIL NFR BLD: 0.7 %
ERYTHROCYTE [DISTWIDTH] IN BLOOD BY AUTOMATED COUNT: 14.2 %
EST. GFR  (AFRICAN AMERICAN): >60 ML/MIN/1.73 M^2
EST. GFR  (NON AFRICAN AMERICAN): >60 ML/MIN/1.73 M^2
GLUCOSE SERPL-MCNC: 221 MG/DL
HCT VFR BLD AUTO: 48.6 %
HDLC SERPL-MCNC: 41 MG/DL
HDLC SERPL: 27.9 %
HGB BLD-MCNC: 15.7 G/DL
IMM GRANULOCYTES # BLD AUTO: 0.08 K/UL
IMM GRANULOCYTES NFR BLD AUTO: 0.6 %
LDLC SERPL CALC-MCNC: 29.4 MG/DL
LYMPHOCYTES # BLD AUTO: 1.7 K/UL
LYMPHOCYTES NFR BLD: 12 %
MAGNESIUM SERPL-MCNC: 1.4 MG/DL
MCH RBC QN AUTO: 30.6 PG
MCHC RBC AUTO-ENTMCNC: 32.3 G/DL
MCV RBC AUTO: 95 FL
MONOCYTES # BLD AUTO: 1.1 K/UL
MONOCYTES NFR BLD: 7.8 %
NEUTROPHILS # BLD AUTO: 11.1 K/UL
NEUTROPHILS NFR BLD: 78.5 %
NONHDLC SERPL-MCNC: 106 MG/DL
NRBC BLD-RTO: 0 /100 WBC
PLATELET # BLD AUTO: 356 K/UL
PMV BLD AUTO: 9.8 FL
POTASSIUM SERPL-SCNC: 4.8 MMOL/L
PROT SERPL-MCNC: 7 G/DL
RBC # BLD AUTO: 5.13 M/UL
SODIUM SERPL-SCNC: 136 MMOL/L
TRIGL SERPL-MCNC: 383 MG/DL
WBC # BLD AUTO: 14.1 K/UL

## 2019-02-27 PROCEDURE — 80197 ASSAY OF TACROLIMUS: CPT

## 2019-02-27 PROCEDURE — 83735 ASSAY OF MAGNESIUM: CPT

## 2019-02-27 PROCEDURE — 84153 ASSAY OF PSA TOTAL: CPT

## 2019-02-27 PROCEDURE — 80061 LIPID PANEL: CPT

## 2019-02-27 PROCEDURE — 85025 COMPLETE CBC W/AUTO DIFF WBC: CPT

## 2019-02-27 PROCEDURE — 36415 COLL VENOUS BLD VENIPUNCTURE: CPT

## 2019-02-27 PROCEDURE — 80053 COMPREHEN METABOLIC PANEL: CPT

## 2019-02-27 PROCEDURE — 83880 ASSAY OF NATRIURETIC PEPTIDE: CPT

## 2019-02-28 ENCOUNTER — TELEPHONE (OUTPATIENT)
Dept: TRANSPLANT | Facility: CLINIC | Age: 69
End: 2019-02-28

## 2019-02-28 LAB — TACROLIMUS BLD-MCNC: 8.5 NG/ML

## 2019-03-06 ENCOUNTER — OFFICE VISIT (OUTPATIENT)
Dept: DERMATOLOGY | Facility: CLINIC | Age: 69
End: 2019-03-06
Payer: MEDICARE

## 2019-03-06 DIAGNOSIS — Z12.83 SCREENING, MALIGNANT NEOPLASM, SKIN: ICD-10-CM

## 2019-03-06 DIAGNOSIS — Z85.828 HISTORY OF SKIN CANCER: ICD-10-CM

## 2019-03-06 DIAGNOSIS — L90.5 SCAR CONDITIONS/SKIN FIBROSIS: Primary | ICD-10-CM

## 2019-03-06 DIAGNOSIS — L57.0 ACTINIC KERATOSES: ICD-10-CM

## 2019-03-06 PROCEDURE — 17000 DESTRUCT PREMALG LESION: CPT | Mod: PBBFAC,PN | Performed by: DERMATOLOGY

## 2019-03-06 PROCEDURE — 99212 OFFICE O/P EST SF 10 MIN: CPT | Mod: PBBFAC,PN | Performed by: DERMATOLOGY

## 2019-03-06 PROCEDURE — 99999 PR PBB SHADOW E&M-EST. PATIENT-LVL II: CPT | Mod: PBBFAC,,, | Performed by: DERMATOLOGY

## 2019-03-06 PROCEDURE — 17003 DESTRUCTION, PREMALIGNANT LESIONS; SECOND THROUGH 14 LESIONS: ICD-10-PCS | Mod: S$PBB,,, | Performed by: DERMATOLOGY

## 2019-03-06 PROCEDURE — 99213 PR OFFICE/OUTPT VISIT, EST, LEVL III, 20-29 MIN: ICD-10-PCS | Mod: 25,S$PBB,, | Performed by: DERMATOLOGY

## 2019-03-06 PROCEDURE — 17000 PR DESTRUCTION(LASER SURGERY,CRYOSURGERY,CHEMOSURGERY),PREMALIGNANT LESIONS,FIRST LESION: ICD-10-PCS | Mod: S$PBB,,, | Performed by: DERMATOLOGY

## 2019-03-06 PROCEDURE — 17003 DESTRUCT PREMALG LES 2-14: CPT | Mod: S$PBB,,, | Performed by: DERMATOLOGY

## 2019-03-06 PROCEDURE — 99999 PR PBB SHADOW E&M-EST. PATIENT-LVL II: ICD-10-PCS | Mod: PBBFAC,,, | Performed by: DERMATOLOGY

## 2019-03-06 PROCEDURE — 17003 DESTRUCT PREMALG LES 2-14: CPT | Mod: PBBFAC,PN | Performed by: DERMATOLOGY

## 2019-03-06 PROCEDURE — 99213 OFFICE O/P EST LOW 20 MIN: CPT | Mod: 25,S$PBB,, | Performed by: DERMATOLOGY

## 2019-03-06 PROCEDURE — 17000 DESTRUCT PREMALG LESION: CPT | Mod: S$PBB,,, | Performed by: DERMATOLOGY

## 2019-03-06 NOTE — PATIENT INSTRUCTIONS

## 2019-03-06 NOTE — PROGRESS NOTES
Subjective:       Patient ID:  Jesse Hernandez Jr. is a 69 y.o. 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  Denies bleeding, tender, growing, or concerning lesions.           Review of Systems   Constitutional: Negative for fever.   Gastrointestinal: Negative for vomiting.   Skin: Negative for daily sunscreen use, activity-related sunscreen use and recent sunburn.   Hematologic/Lymphatic: Does not bruise/bleed easily.        Objective:    Physical Exam   Constitutional: He appears well-developed and well-nourished. No distress.   Neurological: He is alert and oriented to person, place, and time. He is not disoriented.   Psychiatric: He has a normal mood and affect.   Skin:   Areas Examined (abnormalities noted in diagram):   Scalp / Hair Palpated and Inspected  Head / Face Inspection Performed  Neck Inspection Performed  Chest / Axilla Inspection Performed  Abdomen Inspection Performed  Back Inspection Performed  RUE Inspected  LUE Inspection Performed  RLE Inspected  LLE Inspection Performed  Nails and Digits Inspection Performed                   Diagram Legend     Erythematous scaling macule/papule c/w actinic keratosis       Vascular papule c/w angioma      Pigmented verrucoid papule/plaque c/w seborrheic keratosis      Yellow umbilicated papule c/w sebaceous hyperplasia      Irregularly shaped tan macule c/w lentigo     1-2 mm smooth white papules consistent with Milia      Movable subcutaneous cyst with punctum c/w epidermal inclusion cyst      Subcutaneous movable cyst c/w pilar cyst      Firm pink to brown papule c/w dermatofibroma      Pedunculated fleshy papule(s) c/w skin tag(s)      Evenly pigmented macule c/w junctional nevus     Mildly variegated pigmented,  slightly irregular-bordered macule c/w mildly atypical nevus      Flesh colored to evenly pigmented papule c/w intradermal nevus       Pink pearly papule/plaque c/w basal cell carcinoma      Erythematous hyperkeratotic cursted plaque c/w SCC      Surgical scar with no sign of skin cancer recurrence      Open and closed comedones      Inflammatory papules and pustules      Verrucoid papule consistent consistent with wart     Erythematous eczematous patches and plaques     Dystrophic onycholytic nail with subungual debris c/w onychomycosis     Umbilicated papule    Erythematous-base heme-crusted tan verrucoid plaque consistent with inflamed seborrheic keratosis     Erythematous Silvery Scaling Plaque c/w Psoriasis     See annotation      Assessment / Plan:        Scar conditions/skin fibrosis  Screening, malignant neoplasm, skin  History of skin cancer  Scar of the right eyebrow and left parietal scalp, hx of NMSC.  No evidence of recurrence on physical exam today.  Continue routine skin surveillance. Daily sunscreen advised.    Actinic keratoses  Cryosurgery Procedure Note    The patient is informed of the precancerous quality and need for treatment of these lesions. After risks, benefits and alternatives explained, including blistering, pain, hyper- and hypopigmentation, patient verbally consents to cryotherapy to precancerous lesions. Liquid nitrogen cryosurgery is applied to the 3 actinic keratoses, as detailed in the physical exam, to produce a freeze injury. The patient is aware that blisters may form and is instructed on wound care with gentle cleansing and use of vaseline ointment to keep moist until healed. The patient is supplied a handout on cryosurgery and is instructed to call if lesions do not completely resolve.             Follow-up in about 6 months (around 9/6/2019).

## 2019-06-10 DIAGNOSIS — Z94.1 HEART REPLACED BY TRANSPLANT: ICD-10-CM

## 2019-06-10 RX ORDER — AMLODIPINE BESYLATE 5 MG/1
TABLET ORAL
Qty: 90 TABLET | Refills: 11 | Status: SHIPPED | OUTPATIENT
Start: 2019-06-10 | End: 2020-01-01

## 2019-06-17 ENCOUNTER — PATIENT MESSAGE (OUTPATIENT)
Dept: TRANSPLANT | Facility: CLINIC | Age: 69
End: 2019-06-17

## 2019-06-18 DIAGNOSIS — R06.02 SHORTNESS OF BREATH: ICD-10-CM

## 2019-06-18 DIAGNOSIS — T86.20 COMPLICATION OF HEART TRANSPLANT, UNSPECIFIED COMPLICATION: ICD-10-CM

## 2019-06-18 DIAGNOSIS — Z79.52 CURRENT CHRONIC USE OF SYSTEMIC STEROIDS: ICD-10-CM

## 2019-06-18 DIAGNOSIS — Z94.1 STATUS POST HEART TRANSPLANT: ICD-10-CM

## 2019-06-18 DIAGNOSIS — N28.9 RENAL INSUFFICIENCY: ICD-10-CM

## 2019-06-18 DIAGNOSIS — I15.9 SECONDARY HYPERTENSION: ICD-10-CM

## 2019-06-18 DIAGNOSIS — E13.9 POST-TRANSPLANT DIABETES MELLITUS: Primary | ICD-10-CM

## 2019-06-18 DIAGNOSIS — Z79.52 LONG TERM CURRENT USE OF SYSTEMIC STEROIDS: ICD-10-CM

## 2019-06-18 DIAGNOSIS — Z79.899 ENCOUNTER FOR LONG-TERM (CURRENT) USE OF MEDICATIONS: ICD-10-CM

## 2019-06-18 DIAGNOSIS — E78.2 MIXED HYPERLIPIDEMIA: ICD-10-CM

## 2019-06-18 DIAGNOSIS — T86.290 VASCULOPATHY OF CARDIAC ALLOGRAFT: ICD-10-CM

## 2019-06-18 DIAGNOSIS — Z12.5 SCREENING FOR PROSTATE CANCER: ICD-10-CM

## 2019-06-21 ENCOUNTER — DOCUMENTATION ONLY (OUTPATIENT)
Dept: TRANSPLANT | Facility: CLINIC | Age: 69
End: 2019-06-21

## 2019-07-09 ENCOUNTER — PATIENT MESSAGE (OUTPATIENT)
Dept: TRANSPLANT | Facility: CLINIC | Age: 69
End: 2019-07-09

## 2019-08-01 ENCOUNTER — HOSPITAL ENCOUNTER (OUTPATIENT)
Dept: RADIOLOGY | Facility: HOSPITAL | Age: 69
Discharge: HOME | End: 2019-08-01
Attending: INTERNAL MEDICINE
Payer: MEDICARE

## 2019-08-01 DIAGNOSIS — T86.20 COMPLICATION OF HEART TRANSPLANT, UNSPECIFIED COMPLICATION: ICD-10-CM

## 2019-08-01 DIAGNOSIS — Z94.1 STATUS POST HEART TRANSPLANT: ICD-10-CM

## 2019-08-01 PROCEDURE — 71046 X-RAY EXAM CHEST 2 VIEWS: CPT | Mod: TC

## 2019-08-01 PROCEDURE — 71046 X-RAY EXAM CHEST 2 VIEWS: CPT | Mod: 26,,, | Performed by: RADIOLOGY

## 2019-08-01 PROCEDURE — 71046 XR CHEST PA AND LATERAL: ICD-10-PCS | Mod: 26,,, | Performed by: RADIOLOGY

## 2019-08-05 ENCOUNTER — CLINICAL SUPPORT (OUTPATIENT)
Dept: CARDIOLOGY | Facility: CLINIC | Age: 69
End: 2019-08-05
Attending: INTERNAL MEDICINE
Payer: MEDICARE

## 2019-08-05 ENCOUNTER — OFFICE VISIT (OUTPATIENT)
Dept: TRANSPLANT | Facility: CLINIC | Age: 69
End: 2019-08-05
Payer: MEDICARE

## 2019-08-05 ENCOUNTER — HOSPITAL ENCOUNTER (OUTPATIENT)
Dept: RADIOLOGY | Facility: CLINIC | Age: 69
Discharge: HOME | End: 2019-08-05
Attending: INTERNAL MEDICINE
Payer: MEDICARE

## 2019-08-05 VITALS
WEIGHT: 172.81 LBS | HEART RATE: 109 BPM | BODY MASS INDEX: 24.74 KG/M2 | DIASTOLIC BLOOD PRESSURE: 70 MMHG | SYSTOLIC BLOOD PRESSURE: 122 MMHG | HEIGHT: 70 IN

## 2019-08-05 VITALS — DIASTOLIC BLOOD PRESSURE: 70 MMHG | HEART RATE: 105 BPM | SYSTOLIC BLOOD PRESSURE: 109 MMHG

## 2019-08-05 DIAGNOSIS — Z94.1 STATUS POST HEART TRANSPLANT: ICD-10-CM

## 2019-08-05 DIAGNOSIS — Z94.1 TRANSPLANTED HEART: ICD-10-CM

## 2019-08-05 DIAGNOSIS — I25.10 CORONARY ARTERY DISEASE INVOLVING NATIVE CORONARY ARTERY OF NATIVE HEART WITHOUT ANGINA PECTORIS: Primary | ICD-10-CM

## 2019-08-05 DIAGNOSIS — Z94.1 HEART REPLACED BY TRANSPLANT: ICD-10-CM

## 2019-08-05 DIAGNOSIS — E78.2 MIXED HYPERLIPIDEMIA: ICD-10-CM

## 2019-08-05 DIAGNOSIS — Z79.52 CURRENT CHRONIC USE OF SYSTEMIC STEROIDS: ICD-10-CM

## 2019-08-05 DIAGNOSIS — Z94.1 STATUS POST HEART TRANSPLANTATION: ICD-10-CM

## 2019-08-05 DIAGNOSIS — T86.290 VASCULOPATHY OF CARDIAC ALLOGRAFT: ICD-10-CM

## 2019-08-05 DIAGNOSIS — N18.30 BENIGN HYPERTENSIVE HEART AND KIDNEY DISEASE WITH CHF, NYHA CLASS 1 AND CKD STAGE 3: ICD-10-CM

## 2019-08-05 DIAGNOSIS — I13.0 BENIGN HYPERTENSIVE HEART AND KIDNEY DISEASE WITH CHF, NYHA CLASS 1 AND CKD STAGE 3: ICD-10-CM

## 2019-08-05 DIAGNOSIS — I99.9 VASCULOPATHY: ICD-10-CM

## 2019-08-05 DIAGNOSIS — T86.20 COMPLICATION OF HEART TRANSPLANT, UNSPECIFIED COMPLICATION: ICD-10-CM

## 2019-08-05 LAB
CFR FLOW - ANTERIOR: 2.44 CC/MIN/G
CFR FLOW - INFERIOR: 2.15 CC/MIN/G
CFR FLOW - LATERAL: 1.99 CC/MIN/G
CFR FLOW - MAX: 3.3 CC/MIN/G
CFR FLOW - MIN: 1.6 CC/MIN/G
CFR FLOW - SEPTAL: 2.31 CC/MIN/G
CFR FLOW - WHOLE HEART: 2.22 CC/MIN/G
CV STRESS BASE HR: 98 BPM
DIASTOLIC BLOOD PRESSURE: 78 MMHG
END DIASTOLIC INDEX-HIGH: 170 ML/M2
END SYSTOLIC INDEX-HIGH: 70 ML/M2
NUC REST DIASTOLIC VOLUME INDEX: 39
NUC REST EJECTION FRACTION: 77
NUC REST SYSTOLIC VOLUME INDEX: 9
NUC STRESS DIASTOLIC VOLUME INDEX: 42
NUC STRESS EJECTION FRACTION: 87 %
NUC STRESS SYSTOLIC VOLUME INDEX: 6
OHS CV CPX 85 PERCENT MAX PREDICTED HEART RATE MALE: 127
OHS CV CPX MAX PREDICTED HEART RATE: 149
OHS CV CPX PATIENT IS FEMALE: 0
OHS CV CPX PATIENT IS MALE: 1
OHS CV CPX PEAK DIASTOLIC BLOOD PRESSURE: 73 MMHG
OHS CV CPX PEAK HEAR RATE: 104 BPM
OHS CV CPX PEAK RATE PRESSURE PRODUCT: NORMAL
OHS CV CPX PEAK SYSTOLIC BLOOD PRESSURE: 125 MMHG
OHS CV CPX PERCENT MAX PREDICTED HEART RATE ACHIEVED: 70
OHS CV CPX RATE PRESSURE PRODUCT PRESENTING: NORMAL
REST FLOW - ANTERIOR: 1.33 CC/MIN/G
REST FLOW - INFERIOR: 1.23 CC/MIN/G
REST FLOW - LATERAL: 1.58 CC/MIN/G
REST FLOW - MAX: 1.9 CC/MIN/G
REST FLOW - MIN: 0.7 CC/MIN/G
REST FLOW - SEPTAL: 1.11 CC/MIN/G
REST FLOW - WHOLE HEART: 1.31
RETIRED EF AND QEF - SEE NOTES: 51 %
STRESS ECHO TARGET HR: 126.65 BPM
STRESS FLOW - ANTERIOR: 3.16 CC/MIN/G
STRESS FLOW - INFERIOR: 2.56 CC/MIN/G
STRESS FLOW - LATERAL: 3.13 CC/MIN/G
STRESS FLOW - MAX: 3.9 CC/MIN/G
STRESS FLOW - MIN: 1.5 CC/MIN/G
STRESS FLOW - SEPTAL: 2.54 CC/MIN/G
STRESS FLOW - WHOLE HEART: 2.85 CC/MIN/G
SYSTOLIC BLOOD PRESSURE: 120 MMHG

## 2019-08-05 PROCEDURE — 99215 PR OFFICE/OUTPT VISIT, EST, LEVL V, 40-54 MIN: ICD-10-PCS | Mod: S$PBB,,, | Performed by: INTERNAL MEDICINE

## 2019-08-05 PROCEDURE — 78492 MYOCRD IMG PET MLT RST&STRS: CPT | Mod: PBBFAC | Performed by: INTERNAL MEDICINE

## 2019-08-05 PROCEDURE — 93018 CV STRESS TEST I&R ONLY: CPT | Mod: S$PBB,,, | Performed by: INTERNAL MEDICINE

## 2019-08-05 PROCEDURE — 99215 OFFICE O/P EST HI 40 MIN: CPT | Mod: S$PBB,,, | Performed by: INTERNAL MEDICINE

## 2019-08-05 PROCEDURE — 99213 OFFICE O/P EST LOW 20 MIN: CPT | Mod: PBBFAC,25 | Performed by: INTERNAL MEDICINE

## 2019-08-05 PROCEDURE — 99999 PR PBB SHADOW E&M-EST. PATIENT-LVL II: ICD-10-PCS | Mod: PBBFAC,,,

## 2019-08-05 PROCEDURE — 99999 PR PBB SHADOW E&M-EST. PATIENT-LVL III: ICD-10-PCS | Mod: PBBFAC,,, | Performed by: INTERNAL MEDICINE

## 2019-08-05 PROCEDURE — 93016 CV STRESS TEST SUPVJ ONLY: CPT | Mod: S$PBB,,, | Performed by: INTERNAL MEDICINE

## 2019-08-05 PROCEDURE — 77080 DEXA BONE DENSITY SPINE HIP: ICD-10-PCS | Mod: 26,,, | Performed by: INTERNAL MEDICINE

## 2019-08-05 PROCEDURE — 77080 DXA BONE DENSITY AXIAL: CPT | Mod: TC

## 2019-08-05 PROCEDURE — 78492 CARDIAC PET SCAN STRESS (CUPID ONLY): ICD-10-PCS | Mod: 26,S$PBB,, | Performed by: INTERNAL MEDICINE

## 2019-08-05 PROCEDURE — 77080 DXA BONE DENSITY AXIAL: CPT | Mod: 26,,, | Performed by: INTERNAL MEDICINE

## 2019-08-05 PROCEDURE — 99212 OFFICE O/P EST SF 10 MIN: CPT | Mod: PBBFAC,25,27

## 2019-08-05 PROCEDURE — 93016 CARDIAC PET SCAN STRESS (CUPID ONLY): ICD-10-PCS | Mod: S$PBB,,, | Performed by: INTERNAL MEDICINE

## 2019-08-05 PROCEDURE — 99999 PR PBB SHADOW E&M-EST. PATIENT-LVL II: CPT | Mod: PBBFAC,,,

## 2019-08-05 PROCEDURE — 93018 CARDIAC PET SCAN STRESS (CUPID ONLY): ICD-10-PCS | Mod: S$PBB,,, | Performed by: INTERNAL MEDICINE

## 2019-08-05 PROCEDURE — 99999 PR PBB SHADOW E&M-EST. PATIENT-LVL III: CPT | Mod: PBBFAC,,, | Performed by: INTERNAL MEDICINE

## 2019-08-05 RX ORDER — DIPYRIDAMOLE 5 MG/ML
43.78 INJECTION INTRAVENOUS
Status: COMPLETED | OUTPATIENT
Start: 2019-08-05 | End: 2019-08-05

## 2019-08-05 RX ADMIN — DIPYRIDAMOLE 43.8 MG: 5 INJECTION INTRAVENOUS at 10:08

## 2019-08-05 NOTE — PROGRESS NOTES
"Subjective:   Patient ID:  Jesse Hernandez Jr. is a 69 y.o. male who presents for heart transplant follow-up.    CMV Donor:   CMV Recipient:     HPI  He has PMH of OHTx 9/03, with significant CAV, HTN, stage II-III CKD, HLP who presents for 14 annual follow up. Patient underwent LHC earlier this year which demonstrated 60% RCA instent restenosis, and unchanged LAD/CMX disease. He then had PET stress to see if ischemia is an etiology of his progressive ALCANTAR/SOB symptoms. His Stress was normal. Today he presents feeling well. Had significant ALCANTAR lat year but since has improved. Has no chest pain, ALCANTAR, orthopnea. Able to do activities at home including yard work. Continues on prednisone 5 mg daily and prograf 2/3 mg. Triglycerides elevated which he reviewed with his son, he is working on diet.       Annual follow up DOing well. 16 years    Review of Systems   Constitution: Negative for chills, decreased appetite, fever and weight gain.   HENT: Negative for congestion.    Cardiovascular: Negative for chest pain, claudication, cyanosis, dyspnea on exertion, irregular heartbeat, orthopnea, paroxysmal nocturnal dyspnea and syncope.   Respiratory: Negative for cough and shortness of breath.    Musculoskeletal: Positive for falls. Negative for arthritis and back pain.   Gastrointestinal: Negative for nausea and vomiting.   Neurological: Negative for weakness.       Objective:   /70 (BP Location: Left arm, Patient Position: Sitting, BP Method: Large (Automatic))   Pulse 109   Ht 5' 10" (1.778 m)   Wt 78.4 kg (172 lb 13.5 oz)   BMI 24.80 kg/m²     Physical Exam   Constitutional: He appears well-developed and well-nourished. No distress.   HENT:   Head: Normocephalic.   Eyes: Pupils are equal, round, and reactive to light.   Neck: Normal range of motion. No JVD present.   Cardiovascular: Normal rate. Exam reveals no gallop and no friction rub.   No murmur heard.  Pulmonary/Chest: Effort normal. No respiratory distress. "   Abdominal: Soft. He exhibits no distension. There is no tenderness.   Musculoskeletal: Normal range of motion. He exhibits no edema.   Neurological: He is alert. No cranial nerve deficit.   Skin: Skin is warm. He is not diaphoretic. No erythema.         Chemistry        Component Value Date/Time     08/01/2019 0758    K 3.9 08/01/2019 0758     08/01/2019 0758    CO2 24 08/01/2019 0758    BUN 16 08/01/2019 0758    CREATININE 1.4 08/01/2019 0758     (H) 08/01/2019 0758        Component Value Date/Time    CALCIUM 9.2 08/01/2019 0758    ALKPHOS 80 08/01/2019 0758    AST 14 08/01/2019 0758    ALT 20 08/01/2019 0758    BILITOT 0.8 08/01/2019 0758    ESTGFRAFRICA 58.0 (A) 08/01/2019 0758    EGFRNONAA 50.2 (A) 08/01/2019 0758            Magnesium   Date Value Ref Range Status   08/01/2019 1.3 (L) 1.6 - 2.6 mg/dL Final       Lab Results   Component Value Date    WBC 16.69 (H) 08/01/2019    HGB 15.9 08/01/2019    HCT 48.3 08/01/2019    MCV 94 08/01/2019     (H) 08/01/2019       Lab Results   Component Value Date    INR 0.9 11/13/2015    INR 1.0 01/23/2015    INR 1.0 11/21/2014       BNP   Date Value Ref Range Status   08/01/2019 57 0 - 99 pg/mL Final     Comment:     Values of less than 100 pg/ml are consistent with non-CHF populations.   02/27/2019 67 0 - 99 pg/mL Final     Comment:     Values of less than 100 pg/ml are consistent with non-CHF populations.   09/06/2018 70 0 - 99 pg/mL Final     Comment:     Values of less than 100 pg/ml are consistent with non-CHF populations.       No results found for: LDH    Tacrolimus Lvl   Date Value Ref Range Status   08/01/2019 11.9 5.0 - 15.0 ng/mL Final     Comment:     Testing performed by Liquid Chromatography-Tandem  Mass Spectrometry (LC-MS/MS).  This test was developed and its performance characteristics  determined by [facility], Department of Pathology  and Laboratory Medicine in a manner consistent with CLIA  requirements. It has not  been cleared or approved by the [place]  Food and Drug Administration.  This test is used for clinical   purposes.  It should not be regarded as investigational or for  research.       No results found for: SIROLIMUS  No results found for: CYCLOSPORINE    Assessment:     1. Coronary artery disease involving native coronary artery of native heart without angina pectoris    2. Heart replaced by transplant    3. Mixed hyperlipidemia    4. Status post heart transplantation    5. Transplanted heart    6. Vasculopathy    7. 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 PET scan pernding    RTC annually      Return instructions as set forth by post transplant schedule or as needed:    Clinic: Return for labs and/or biopsy weekly the first month, every two weeks during month 2 and then monthly for the first year at the provider or coordinator's discretion. During the second year, return to clinic every 3 months. Post transplant year 3-5 return every 6 months. There will be a comprehensive post transplant evaluation every year that may include LHC/RHC/biopsy, stress test, echo, CXR, and other health screening exams.    In addition to the clinical assessment, I have ordered Allomap testing for this patient to assist in identification of moderate/severe acute cellular rejection (ACR) in a pt with stable Allograft function instead of endomyocardial biopsy.     Patient is reminded to call with any health changes as these can be early signs of transplant complications. Patient is advised to make sure any new medications or changes of old medications are discussed with a pharmacist or physician knowledgeable with transplant to avoid rejection/drug toxicity related to significant drug interactions.    UNOS Patient Status  Functional Status: 80% - Normal activity with effort: some symptoms of disease  Physical Capacity: No Limitations  Working for Income:  No  If no, reason not working: Patient Choice - Retired

## 2019-08-05 NOTE — LETTER
August 5, 2019        Iftikhar Houston  [address] [facility] [address]  Phone: [phone]  Fax: [phone]             [facility] [address] [place] [address]  Phone: [phone]   Patient: Jesse Hernandez Jr.   MR Number: [id number]   YOB: 1950   Date of Visit: 8/5/2019       Dear Dr. Iftikhar Houston    Thank you for referring Jesse Hernandez to me for evaluation. Attached you will find relevant portions of my assessment and plan of care.    If you have questions, please do not hesitate to call me. I look forward to following Jesse Hernandez along with you.    Sincerely,    Felix Birch MD    Enclosure    If you would like to receive this communication electronically, please contact [email or web address] or [phone] to request [employer] access.    [employer] is a tool which provides read-only access to select patient information with whom you have a relationship. Its easy to use and provides real time access to review your patients record including encounter summaries, notes, results, and demographic information.    If you feel you have received this communication in error or would no longer like to receive these types of communications, please e-mail [email or web address]

## 2019-08-06 ENCOUNTER — TELEPHONE (OUTPATIENT)
Dept: TRANSPLANT | Facility: CLINIC | Age: 69
End: 2019-08-06

## 2019-08-06 NOTE — TELEPHONE ENCOUNTER
Spoke to pt's wife regarding pet stress which was normal with no significant changes. She was excited as she is now retired too. She said Jesse was at the doctor's office regarding the DM.

## 2019-08-27 ENCOUNTER — LAB VISIT (OUTPATIENT)
Dept: LAB | Facility: HOSPITAL | Age: 69
End: 2019-08-27
Attending: UROLOGY
Payer: MEDICARE

## 2019-08-27 DIAGNOSIS — N40.0 BENIGN PROSTATIC HYPERPLASIA, UNSPECIFIED WHETHER LOWER URINARY TRACT SYMPTOMS PRESENT: ICD-10-CM

## 2019-08-27 DIAGNOSIS — Z12.5 PROSTATE CANCER SCREENING: ICD-10-CM

## 2019-08-27 LAB — COMPLEXED PSA SERPL-MCNC: 0.9 NG/ML (ref 0–4)

## 2019-08-27 PROCEDURE — 84153 ASSAY OF PSA TOTAL: CPT

## 2019-08-27 PROCEDURE — 36415 COLL VENOUS BLD VENIPUNCTURE: CPT

## 2019-08-28 RX ORDER — TAMSULOSIN HYDROCHLORIDE 0.4 MG/1
CAPSULE ORAL
Qty: 30 CAPSULE | Refills: 11 | Status: SHIPPED | OUTPATIENT
Start: 2019-08-28 | End: 2019-09-04 | Stop reason: SDUPTHER

## 2019-08-28 RX ORDER — FINASTERIDE 5 MG/1
TABLET, FILM COATED ORAL
Qty: 30 TABLET | Refills: 11 | Status: SHIPPED | OUTPATIENT
Start: 2019-08-28 | End: 2019-09-04 | Stop reason: SDUPTHER

## 2019-09-04 ENCOUNTER — OFFICE VISIT (OUTPATIENT)
Dept: UROLOGY | Facility: CLINIC | Age: 69
End: 2019-09-04
Payer: MEDICARE

## 2019-09-04 ENCOUNTER — OFFICE VISIT (OUTPATIENT)
Dept: DERMATOLOGY | Facility: CLINIC | Age: 69
End: 2019-09-04
Payer: MEDICARE

## 2019-09-04 VITALS
WEIGHT: 172 LBS | SYSTOLIC BLOOD PRESSURE: 116 MMHG | HEART RATE: 113 BPM | BODY MASS INDEX: 24.62 KG/M2 | DIASTOLIC BLOOD PRESSURE: 82 MMHG | HEIGHT: 70 IN

## 2019-09-04 DIAGNOSIS — Z12.5 PROSTATE CANCER SCREENING: ICD-10-CM

## 2019-09-04 DIAGNOSIS — L90.5 SCAR CONDITIONS/SKIN FIBROSIS: Primary | ICD-10-CM

## 2019-09-04 DIAGNOSIS — N40.0 BENIGN PROSTATIC HYPERPLASIA, UNSPECIFIED WHETHER LOWER URINARY TRACT SYMPTOMS PRESENT: Primary | ICD-10-CM

## 2019-09-04 DIAGNOSIS — D48.5 NEOPLASM OF UNCERTAIN BEHAVIOR OF SKIN: ICD-10-CM

## 2019-09-04 DIAGNOSIS — Z85.828 HISTORY OF SKIN CANCER: ICD-10-CM

## 2019-09-04 DIAGNOSIS — L57.0 ACTINIC KERATOSES: ICD-10-CM

## 2019-09-04 DIAGNOSIS — L82.1 SEBORRHEIC KERATOSIS: ICD-10-CM

## 2019-09-04 DIAGNOSIS — Z12.83 SCREENING, MALIGNANT NEOPLASM, SKIN: ICD-10-CM

## 2019-09-04 LAB
BILIRUB SERPL-MCNC: NORMAL MG/DL
BLOOD URINE, POC: NORMAL
COLOR, POC UA: YELLOW
GLUCOSE UR QL STRIP: NORMAL
KETONES UR QL STRIP: NORMAL
LEUKOCYTE ESTERASE URINE, POC: NORMAL
NITRITE, POC UA: NORMAL
PH, POC UA: 6
PROTEIN, POC: NORMAL
SPECIFIC GRAVITY, POC UA: 1.01
UROBILINOGEN, POC UA: NORMAL

## 2019-09-04 PROCEDURE — 99214 OFFICE O/P EST MOD 30 MIN: CPT | Mod: S$PBB,,, | Performed by: UROLOGY

## 2019-09-04 PROCEDURE — 11102 TANGNTL BX SKIN SINGLE LES: CPT | Mod: PBBFAC,59 | Performed by: DERMATOLOGY

## 2019-09-04 PROCEDURE — 99999 PR PBB SHADOW E&M-EST. PATIENT-LVL III: ICD-10-PCS | Mod: PBBFAC,,, | Performed by: UROLOGY

## 2019-09-04 PROCEDURE — 11102 PR TANGENTIAL BIOPSY, SKIN, SINGLE LESION: ICD-10-PCS | Mod: S$PBB,,, | Performed by: DERMATOLOGY

## 2019-09-04 PROCEDURE — 88305 TISSUE EXAM BY PATHOLOGIST: CPT | Mod: 26,,, | Performed by: PATHOLOGY

## 2019-09-04 PROCEDURE — 99999 PR PBB SHADOW E&M-EST. PATIENT-LVL III: CPT | Mod: PBBFAC,,, | Performed by: DERMATOLOGY

## 2019-09-04 PROCEDURE — 99213 OFFICE O/P EST LOW 20 MIN: CPT | Mod: PBBFAC | Performed by: UROLOGY

## 2019-09-04 PROCEDURE — 99999 PR PBB SHADOW E&M-EST. PATIENT-LVL III: ICD-10-PCS | Mod: PBBFAC,,, | Performed by: DERMATOLOGY

## 2019-09-04 PROCEDURE — 99214 PR OFFICE/OUTPT VISIT, EST, LEVL IV, 30-39 MIN: ICD-10-PCS | Mod: S$PBB,,, | Performed by: UROLOGY

## 2019-09-04 PROCEDURE — 17003 DESTRUCT PREMALG LES 2-14: CPT | Mod: PBBFAC | Performed by: DERMATOLOGY

## 2019-09-04 PROCEDURE — 17003 DESTRUCT PREMALG LES 2-14: CPT | Mod: S$PBB,,, | Performed by: DERMATOLOGY

## 2019-09-04 PROCEDURE — 88305 TISSUE SPECIMEN TO PATHOLOGY, DERMATOLOGY: ICD-10-PCS | Mod: 26,,, | Performed by: PATHOLOGY

## 2019-09-04 PROCEDURE — 99999 PR PBB SHADOW E&M-EST. PATIENT-LVL III: CPT | Mod: PBBFAC,,, | Performed by: UROLOGY

## 2019-09-04 PROCEDURE — 17000 PR DESTRUCTION(LASER SURGERY,CRYOSURGERY,CHEMOSURGERY),PREMALIGNANT LESIONS,FIRST LESION: ICD-10-PCS | Mod: 59,S$PBB,, | Performed by: DERMATOLOGY

## 2019-09-04 PROCEDURE — 17003 DESTRUCTION, PREMALIGNANT LESIONS; SECOND THROUGH 14 LESIONS: ICD-10-PCS | Mod: S$PBB,,, | Performed by: DERMATOLOGY

## 2019-09-04 PROCEDURE — 99213 OFFICE O/P EST LOW 20 MIN: CPT | Mod: PBBFAC,27 | Performed by: DERMATOLOGY

## 2019-09-04 PROCEDURE — 17000 DESTRUCT PREMALG LESION: CPT | Mod: 59,S$PBB,, | Performed by: DERMATOLOGY

## 2019-09-04 PROCEDURE — 99213 OFFICE O/P EST LOW 20 MIN: CPT | Mod: 25,S$PBB,, | Performed by: DERMATOLOGY

## 2019-09-04 PROCEDURE — 17000 DESTRUCT PREMALG LESION: CPT | Mod: 59,PBBFAC | Performed by: DERMATOLOGY

## 2019-09-04 PROCEDURE — 11102 TANGNTL BX SKIN SINGLE LES: CPT | Mod: S$PBB,,, | Performed by: DERMATOLOGY

## 2019-09-04 PROCEDURE — 81002 URINALYSIS NONAUTO W/O SCOPE: CPT | Mod: PBBFAC | Performed by: UROLOGY

## 2019-09-04 PROCEDURE — 99213 PR OFFICE/OUTPT VISIT, EST, LEVL III, 20-29 MIN: ICD-10-PCS | Mod: 25,S$PBB,, | Performed by: DERMATOLOGY

## 2019-09-04 PROCEDURE — 88305 TISSUE EXAM BY PATHOLOGIST: CPT | Performed by: PATHOLOGY

## 2019-09-04 RX ORDER — TAMSULOSIN HYDROCHLORIDE 0.4 MG/1
1 CAPSULE ORAL DAILY
Qty: 30 CAPSULE | Refills: 11 | Status: SHIPPED | OUTPATIENT
Start: 2019-09-04 | End: 2020-01-01

## 2019-09-04 RX ORDER — FINASTERIDE 5 MG/1
5 TABLET, FILM COATED ORAL DAILY
Qty: 30 TABLET | Refills: 11 | Status: SHIPPED | OUTPATIENT
Start: 2019-09-04 | End: 2020-01-01

## 2019-09-04 NOTE — PROGRESS NOTES
Subjective:       Patient ID:  Jesse Hernandez Jr. is a 69 y.o. 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  She c/o lesion of the left temple and right neck x 1 month.  + crusted. No tx tried. Denies bleeding, tender, growing, or concerning lesions.         Review of Systems   Constitutional: Negative for fever and chills.   Gastrointestinal: Negative for nausea and vomiting.   Skin: Negative for daily sunscreen use, activity-related sunscreen use and recent sunburn.   Hematologic/Lymphatic: Does not bruise/bleed easily.        Objective:    Physical Exam   Constitutional: He appears well-developed and well-nourished. No distress.   Neurological: He is alert and oriented to person, place, and time. He is not disoriented.   Psychiatric: He has a normal mood and affect.   Skin:   Areas Examined (abnormalities noted in diagram):   Scalp / Hair Palpated and Inspected  Head / Face Inspection Performed  Neck Inspection Performed  Chest / Axilla Inspection Performed  Abdomen Inspection Performed  Back Inspection Performed  RUE Inspected  LUE Inspection Performed  Nails and Digits Inspection Performed                   Diagram Legend     Erythematous scaling macule/papule c/w actinic keratosis       Vascular papule c/w angioma      Pigmented verrucoid papule/plaque c/w seborrheic keratosis      Yellow umbilicated papule c/w sebaceous hyperplasia      Irregularly shaped tan macule c/w lentigo     1-2 mm smooth white papules consistent with Milia      Movable subcutaneous cyst with punctum c/w epidermal inclusion cyst      Subcutaneous movable cyst c/w pilar cyst      Firm pink to brown papule c/w dermatofibroma       Pedunculated fleshy papule(s) c/w skin tag(s)      Evenly pigmented macule c/w junctional nevus     Mildly variegated pigmented, slightly irregular-bordered macule c/w mildly atypical nevus      Flesh colored to evenly pigmented papule c/w intradermal nevus       Pink pearly papule/plaque c/w basal cell carcinoma      Erythematous hyperkeratotic cursted plaque c/w SCC      Surgical scar with no sign of skin cancer recurrence      Open and closed comedones      Inflammatory papules and pustules      Verrucoid papule consistent consistent with wart     Erythematous eczematous patches and plaques     Dystrophic onycholytic nail with subungual debris c/w onychomycosis     Umbilicated papule    Erythematous-base heme-crusted tan verrucoid plaque consistent with inflamed seborrheic keratosis     Erythematous Silvery Scaling Plaque c/w Psoriasis     See annotation                Assessment / Plan:      Pathology Orders:     Normal Orders This Visit    Tissue Specimen To Pathology, Dermatology     Questions:    Directional Terms:  Other(comment)    Clinical Information:  r/o SCC    Specific Site:  left antihelix        Scar conditions/skin fibrosis  Screening, malignant neoplasm, skin  History of skin cancer  Scar of the left parietal scalp and right eyebrow, hx of NMSC.  No evidence of recurrence on physical exam today.  Continue routine skin surveillance. Daily sunscreen advised.    Seborrheic keratosis  Reassurance given. Discussed diagnosis and that lesions are benign.  AAD handout given.     Actinic keratoses  Cryosurgery Procedure Note    The patient is informed of the precancerous quality and need for treatment of these lesions. After risks, benefits and alternatives explained, including blistering, pain, hyper- and hypopigmentation, patient verbally consents to cryotherapy to precancerous lesions. Liquid nitrogen cryosurgery is applied to the 4 actinic keratoses, as detailed in the physical exam, to produce a  freeze injury. The patient is aware that blisters may form and is instructed on wound care with gentle cleansing and use of vaseline ointment to keep moist until healed. The patient is supplied a handout on cryosurgery and is instructed to call if lesions do not completely resolve.    Neoplasm of uncertain behavior of skin  -     Tissue Specimen To Pathology, Dermatology  -     Shave biopsy(-ies) done of 1 site(s).   Patient informed to call for results within 2 weeks if have not [facility]           Follow up for call for results.     PROCEDURE NOTE - SHAVE BIOPSY   Location: see above    After risk, benefits, and alternatives were discussed with the patient, the patient agrees to the procedure by verbal informed consent.  The area(s) were cleansed with alcohol. 2 cc of lidocaine 1% with epinephrine was injected for local anesthesia into each lesion(s).  A sharp dermablade was used to remove part or all of the lesion(s).  The specimen(s) will be sent for tissue pathology.  Hemostasis was obtained with aluminum chloride and/or hyfrecation.  The area(s) were dressed with vaseline ointment and bandaged.  The patient tolerated the procedure well without adverse events.  Wound care instructions were given to the patient on the AVS.  The patient will be notified of pathology results once available. Results will also be available in Epic.

## 2019-09-04 NOTE — PATIENT INSTRUCTIONS
CRYOSURGERY      Your doctor has used a method called cryosurgery to treat your skin condition. Cryosurgery refers to the use of very cold substances to treat a variety of skin conditions such as warts, pre-skin cancers, molluscum contagiosum, sun spots, and several benign growths. The substance we use in cryosurgery is liquid nitrogen and is so cold (-195 degrees Celsius) that is burns when administered.     Following treatment in the office, the skin may immediately burn and become red. You may find the area around the lesion is affected as well. It is sometimes necessary to treat not only the lesion, but a small area of the surrounding normal skin to achieve a good response.     A blister, and even a blood filled blister, may form after treatment.   This is a normal response. If the blister is painful, it is acceptable to sterilize a needle and with rubbing alcohol and gently pop the blister. It is important that you gently wash the area with soap and warm water as the blister fluid may contain wart virus if a wart was treated. Do no remove the roof of the blister.     The area treated can take anywhere from 1-3 weeks to heal. Healing time depends on the kind of skin lesion treated, the location, and how aggressively the lesion was treated. It is recommended that the areas treated are covered with Vaseline or bacitracin ointment and a band-aid. If a band-aid is not practical, just ointment applied several times per day will do. Keeping these areas moist will speed the healing time.    Treatment with liquid nitrogen can leave a scar. In dark skin, it may be a light or dark scar, in light skin it may be a white or pink scar. These will generally fade with time.    If you have any concerns after your treatment, please feel free to call the office.         [facility] DERMATOLOGY  [address] [address] [address]  Dept: [phone]  Dept Fax: [phone]     Shave Biopsy Wound  Care    Your doctor has performed a shave biopsy today.  A band aid and vaseline ointment has been placed over the site.  This should remain in place for 24 hours.  It is recommended that you keep the area dry for the first 24 hours.  After 24 hours, you may remove the band aid and wash the area with warm soap and water and apply Vaseline jelly.  Many patients prefer to use Neosporin or Bacitracin ointment.  This is acceptable; however, know that you can develop an allergy to this medication even if you have used it safely for years.  It is important to keep the area moist.  Letting it dry out and get air slows healing time, and will worsen the scar.  Band aid is optional after first 24 hours.      If you notice increasing redness, tenderness, pain, or yellow drainage at the biopsy site, please notify your doctor.  These are signs of an infection.    If your biopsy site is bleeding, apply firm pressure for 15 minutes straight.  Repeat for another 15 minutes, if it is still bleeding.   If the surgical site continues to bleed, then please contact your doctor.      [facility] - [facility]   DERMATOLOGY  [address]   [facility] [address]   Dept: [phone]   Dept Fax: [phone]

## 2019-09-04 NOTE — PROGRESS NOTES
"Chief Complaint: LUTS    HPI:   9/4/19: Voiding well no complaints.  Reviewed history in detail.   8/27/18: Been on flomax/finasteride and voiding better after last 6 months.  Reviewed history in detail.  2 cups coffee now.  2/26/18: Had a heart cath last week.  Voiding fine.  Doesn't feel he empties all the way.  Not taking the flomax hasn't missed it.  Drinks a lot of coffee, down to 3 cups a day.  8/23/17: No microhematuria observed at last visit.  No LUTS but mild retention.  8/23/16: Pt had some frequency and is taking a OTC supplement and it made things better; he hasn't taken it in three weeks but frequency is still better.  Is a heart transplant pt and this appt was made when he discussed his LUTS with the transplant service.  Overall feeling fine no problems to speak of.  Had a CT 6/16 that shows some renal cysts no other sig findings. Nocturia x2.  3/15: Elva: "65-year-old male returns to the clinic today for follow-up on his urinary infection.  Since his last visit when we had given him the antibiotics, his symptoms have cleared.  He is not having any urinary problems at present.  He still has nocturia ×1, but is otherwise doing very well. His urine culture did not show any growth, but his symptoms have cleared and his urinalysis today is also normal.  There was a small amount of blood on the dipstick, but only an occasional RBC under the microscope, and no leukocytes or nitrites."    Allergies:  Shellfish containing products    Medications:  has a current medication list which includes the following prescription(s): amlodipine, aspirin, atorvastatin, clopidogrel, ezetimibe, finasteride, folic acid-vit b6-vit b12 2.5-25-2 mg, furosemide, hydralazine, metformin, omega-3 acid ethyl esters, prednisone, ranitidine, tacrolimus, tamsulosin, and zetia.    Review of Systems:  General: No fever, chills, fatigability, or weight loss.  Skin: No rashes, itching, or changes in color or texture of skin.  Chest: Denies " ALCANTAR, cyanosis, wheezing, cough, and sputum production.  Abdomen: Appetite fine. No weight loss. Denies diarrhea, abdominal pain, hematemesis, or blood in stool.  Musculoskeletal: No joint stiffness or swelling. Denies back pain.  : As above.  All other review of systems negative.    PMH:   has a past medical history of CHF (congestive heart failure), Coronary artery disease, Heart attack, Hyperlipidemia, Hypertension, Osteoarthritis of hip (7/25/2016), Squamous cell carcinoma (4/2015), and Tubular adenoma of colon (10/10/2012).    PSH:   has a past surgical history that includes Heart transplant; Hemorrhoid surgery; Parathyroid gland surgery; Tonsillectomy; Cardiac catheterization; Colonoscopy (N/A, 9/2/2016); Skin biopsy; Heart transplant; and Colonoscopy (N/A, 11/26/2018).    FamHx: family history includes Cancer in his sister; Heart attack in his brother, father, maternal grandfather, maternal grandmother, mother, and sister; Heart failure in his brother, father, maternal grandfather, maternal grandmother, and sister; Hypertension in his father and mother.    SocHx:  reports that he quit smoking about 3 years ago. His smoking use included pipe. He has never used smokeless tobacco. He reports that he does not drink alcohol or use drugs.      Physical Exam:  Vitals:    09/04/19 1020   BP: 116/82   Pulse: (!) 113     General: A&Ox3, no apparent distress, no deformities  Neck: No masses, normal thyroid  Lungs: normal inspiration, no use of accessory muscles  Heart: normal pulse, no arrhythmias  Abdomen: Soft, NT, ND  Skin: The skin is warm and dry. No jaundice.  Ext: No c/c/e.  :   8/17: Test desc kenn, no abnormalities of epididymus. Penis normal, with normal penile and scrotal skin. Meatus normal. Normal rectal tone, no hemorrhoids. Prost 40 gm no nodules or masses appreciated. SV not palpable. Perineum and anus normal.    Labs/Studies:   Urinalysis performed in clinic, summary: UA normal  PSA    6/16: 2.0     8/17: 2.5    8/19: 0.9  Bladder Scan performed in office:     8/17:  ml.    2/26/18:  ml    Impression/Plan:   1.  Flomax/finasteride have allowed improvement.  2.  PSA/RTC 12 mo

## 2019-10-02 RX ORDER — PREDNISONE 5 MG/1
TABLET ORAL
Qty: 30 TABLET | Refills: 11 | Status: SHIPPED | OUTPATIENT
Start: 2019-10-02

## 2019-10-02 RX ORDER — FUROSEMIDE 20 MG/1
TABLET ORAL
Qty: 30 TABLET | Refills: 10 | Status: SHIPPED | OUTPATIENT
Start: 2019-10-02 | End: 2020-01-01

## 2019-10-02 RX ORDER — HYDRALAZINE HYDROCHLORIDE 25 MG/1
TABLET, FILM COATED ORAL
Qty: 90 TABLET | Refills: 11 | Status: SHIPPED | OUTPATIENT
Start: 2019-10-02

## 2019-10-02 RX ORDER — CLOPIDOGREL BISULFATE 75 MG/1
TABLET ORAL
Qty: 30 TABLET | Refills: 11 | Status: SHIPPED | OUTPATIENT
Start: 2019-10-02

## 2019-10-10 ENCOUNTER — PATIENT MESSAGE (OUTPATIENT)
Dept: DERMATOLOGY | Facility: CLINIC | Age: 69
End: 2019-10-10

## 2019-10-11 ENCOUNTER — TELEPHONE (OUTPATIENT)
Dept: DERMATOLOGY | Facility: CLINIC | Age: 69
End: 2019-10-11

## 2019-10-11 NOTE — TELEPHONE ENCOUNTER
----- Message from Devi Wills sent at 10/11/2019 10:22 AM CDT -----  Contact: pt  Type:  Patient Returning Call    Who Called:Patient  Who Left Message for Patient:nurse  Does the patient know what this is regarding?:letter in mail  Would the patient rather a call back or a response via [facility]? Call back  Best Call Back Number:[phone]  Additional Information: na

## 2019-10-28 ENCOUNTER — PATIENT MESSAGE (OUTPATIENT)
Dept: TRANSPLANT | Facility: CLINIC | Age: 69
End: 2019-10-28

## 2019-11-03 RX ORDER — ATORVASTATIN CALCIUM 40 MG/1
TABLET, FILM COATED ORAL
Qty: 60 TABLET | Refills: 11 | Status: SHIPPED | OUTPATIENT
Start: 2019-11-03

## 2019-11-12 RX ORDER — TACROLIMUS 1 MG/1
CAPSULE ORAL
Qty: 450 CAPSULE | Refills: 0 | Status: SHIPPED | OUTPATIENT
Start: 2019-11-12 | End: 2020-01-01

## 2019-12-09 ENCOUNTER — TELEPHONE (OUTPATIENT)
Dept: DERMATOLOGY | Facility: CLINIC | Age: 69
End: 2019-12-09

## 2019-12-09 ENCOUNTER — LAB VISIT (OUTPATIENT)
Dept: LAB | Facility: HOSPITAL | Age: 69
End: 2019-12-09
Attending: INTERNAL MEDICINE
Payer: MEDICARE

## 2019-12-09 ENCOUNTER — OFFICE VISIT (OUTPATIENT)
Dept: DERMATOLOGY | Facility: CLINIC | Age: 69
End: 2019-12-09
Payer: MEDICARE

## 2019-12-09 DIAGNOSIS — E13.9 POST-TRANSPLANT DIABETES MELLITUS: ICD-10-CM

## 2019-12-09 DIAGNOSIS — N28.9 RENAL INSUFFICIENCY: ICD-10-CM

## 2019-12-09 DIAGNOSIS — L57.0 ACTINIC KERATOSES: ICD-10-CM

## 2019-12-09 DIAGNOSIS — T86.290 VASCULOPATHY OF CARDIAC ALLOGRAFT: ICD-10-CM

## 2019-12-09 DIAGNOSIS — Z79.899 ENCOUNTER FOR LONG-TERM (CURRENT) USE OF MEDICATIONS: ICD-10-CM

## 2019-12-09 DIAGNOSIS — L08.9 SKIN INFECTION: ICD-10-CM

## 2019-12-09 DIAGNOSIS — Z85.828 HISTORY OF SKIN CANCER: ICD-10-CM

## 2019-12-09 DIAGNOSIS — Z94.1 STATUS POST HEART TRANSPLANT: ICD-10-CM

## 2019-12-09 DIAGNOSIS — T86.20 COMPLICATION OF HEART TRANSPLANT, UNSPECIFIED COMPLICATION: ICD-10-CM

## 2019-12-09 DIAGNOSIS — Z12.83 SCREENING, MALIGNANT NEOPLASM, SKIN: ICD-10-CM

## 2019-12-09 DIAGNOSIS — L90.5 SCAR CONDITIONS/SKIN FIBROSIS: Primary | ICD-10-CM

## 2019-12-09 DIAGNOSIS — E78.2 MIXED HYPERLIPIDEMIA: ICD-10-CM

## 2019-12-09 LAB
ALBUMIN SERPL BCP-MCNC: 3.5 G/DL (ref 3.5–5.2)
ALP SERPL-CCNC: 72 U/L (ref 55–135)
ALT SERPL W/O P-5'-P-CCNC: 18 U/L (ref 10–44)
ANION GAP SERPL CALC-SCNC: 11 MMOL/L (ref 8–16)
AST SERPL-CCNC: 15 U/L (ref 10–40)
BASOPHILS # BLD AUTO: 0.06 K/UL (ref 0–0.2)
BASOPHILS NFR BLD: 0.4 % (ref 0–1.9)
BILIRUB SERPL-MCNC: 0.8 MG/DL (ref 0.1–1)
BNP SERPL-MCNC: 100 PG/ML (ref 0–99)
BUN SERPL-MCNC: 15 MG/DL (ref 8–23)
CALCIUM SERPL-MCNC: 9.5 MG/DL (ref 8.7–10.5)
CHLORIDE SERPL-SCNC: 107 MMOL/L (ref 95–110)
CHOLEST SERPL-MCNC: 121 MG/DL (ref 120–199)
CHOLEST/HDLC SERPL: 2.7 {RATIO} (ref 2–5)
CO2 SERPL-SCNC: 24 MMOL/L (ref 23–29)
CREAT SERPL-MCNC: 1.2 MG/DL (ref 0.5–1.4)
DIFFERENTIAL METHOD: ABNORMAL
EOSINOPHIL # BLD AUTO: 0.4 K/UL (ref 0–0.5)
EOSINOPHIL NFR BLD: 2.5 % (ref 0–8)
ERYTHROCYTE [DISTWIDTH] IN BLOOD BY AUTOMATED COUNT: 14.2 % (ref 11.5–14.5)
EST. GFR  (AFRICAN AMERICAN): >60 ML/MIN/1.73 M^2
EST. GFR  (NON AFRICAN AMERICAN): >60 ML/MIN/1.73 M^2
ESTIMATED AVG GLUCOSE: 137 MG/DL (ref 68–131)
GLUCOSE SERPL-MCNC: 105 MG/DL (ref 70–110)
HBA1C MFR BLD HPLC: 6.4 % (ref 4–5.6)
HCT VFR BLD AUTO: 49.1 % (ref 40–54)
HDLC SERPL-MCNC: 45 MG/DL (ref 40–75)
HDLC SERPL: 37.2 % (ref 20–50)
HGB BLD-MCNC: 15.6 G/DL (ref 14–18)
IMM GRANULOCYTES # BLD AUTO: 0.04 K/UL (ref 0–0.04)
IMM GRANULOCYTES NFR BLD AUTO: 0.3 % (ref 0–0.5)
LDLC SERPL CALC-MCNC: 30 MG/DL (ref 63–159)
LYMPHOCYTES # BLD AUTO: 2.9 K/UL (ref 1–4.8)
LYMPHOCYTES NFR BLD: 20.9 % (ref 18–48)
MAGNESIUM SERPL-MCNC: 1.7 MG/DL (ref 1.6–2.6)
MCH RBC QN AUTO: 30.9 PG (ref 27–31)
MCHC RBC AUTO-ENTMCNC: 31.8 G/DL (ref 32–36)
MCV RBC AUTO: 97 FL (ref 82–98)
MONOCYTES # BLD AUTO: 1.4 K/UL (ref 0.3–1)
MONOCYTES NFR BLD: 10.2 % (ref 4–15)
NEUTROPHILS # BLD AUTO: 9 K/UL (ref 1.8–7.7)
NEUTROPHILS NFR BLD: 65.7 % (ref 38–73)
NONHDLC SERPL-MCNC: 76 MG/DL
NRBC BLD-RTO: 0 /100 WBC
PLATELET # BLD AUTO: 335 K/UL (ref 150–350)
PMV BLD AUTO: 9.5 FL (ref 9.2–12.9)
POTASSIUM SERPL-SCNC: 4.9 MMOL/L (ref 3.5–5.1)
PROT SERPL-MCNC: 6.8 G/DL (ref 6–8.4)
RBC # BLD AUTO: 5.05 M/UL (ref 4.6–6.2)
SODIUM SERPL-SCNC: 142 MMOL/L (ref 136–145)
TRIGL SERPL-MCNC: 230 MG/DL (ref 30–150)
WBC # BLD AUTO: 13.77 K/UL (ref 3.9–12.7)

## 2019-12-09 PROCEDURE — 17000 DESTRUCT PREMALG LESION: CPT | Mod: PBBFAC | Performed by: DERMATOLOGY

## 2019-12-09 PROCEDURE — 83735 ASSAY OF MAGNESIUM: CPT

## 2019-12-09 PROCEDURE — 83036 HEMOGLOBIN GLYCOSYLATED A1C: CPT

## 2019-12-09 PROCEDURE — 1159F PR MEDICATION LIST DOCUMENTED IN MEDICAL RECORD: ICD-10-PCS | Mod: ,,, | Performed by: DERMATOLOGY

## 2019-12-09 PROCEDURE — 36415 COLL VENOUS BLD VENIPUNCTURE: CPT

## 2019-12-09 PROCEDURE — 17003 DESTRUCTION, PREMALIGNANT LESIONS; SECOND THROUGH 14 LESIONS: ICD-10-PCS | Mod: S$PBB,,, | Performed by: DERMATOLOGY

## 2019-12-09 PROCEDURE — 99999 PR PBB SHADOW E&M-EST. PATIENT-LVL II: CPT | Mod: PBBFAC,,, | Performed by: DERMATOLOGY

## 2019-12-09 PROCEDURE — 87070 CULTURE OTHR SPECIMN AEROBIC: CPT

## 2019-12-09 PROCEDURE — 1159F MED LIST DOCD IN RCRD: CPT | Mod: ,,, | Performed by: DERMATOLOGY

## 2019-12-09 PROCEDURE — 17000 DESTRUCT PREMALG LESION: CPT | Mod: S$PBB,,, | Performed by: DERMATOLOGY

## 2019-12-09 PROCEDURE — 80053 COMPREHEN METABOLIC PANEL: CPT

## 2019-12-09 PROCEDURE — 80061 LIPID PANEL: CPT

## 2019-12-09 PROCEDURE — 85025 COMPLETE CBC W/AUTO DIFF WBC: CPT

## 2019-12-09 PROCEDURE — 99214 OFFICE O/P EST MOD 30 MIN: CPT | Mod: 25,S$PBB,, | Performed by: DERMATOLOGY

## 2019-12-09 PROCEDURE — 17003 DESTRUCT PREMALG LES 2-14: CPT | Mod: S$PBB,,, | Performed by: DERMATOLOGY

## 2019-12-09 PROCEDURE — 17000 PR DESTRUCTION(LASER SURGERY,CRYOSURGERY,CHEMOSURGERY),PREMALIGNANT LESIONS,FIRST LESION: ICD-10-PCS | Mod: S$PBB,,, | Performed by: DERMATOLOGY

## 2019-12-09 PROCEDURE — 99214 PR OFFICE/OUTPT VISIT, EST, LEVL IV, 30-39 MIN: ICD-10-PCS | Mod: 25,S$PBB,, | Performed by: DERMATOLOGY

## 2019-12-09 PROCEDURE — 80197 ASSAY OF TACROLIMUS: CPT

## 2019-12-09 PROCEDURE — 83880 ASSAY OF NATRIURETIC PEPTIDE: CPT

## 2019-12-09 PROCEDURE — 99999 PR PBB SHADOW E&M-EST. PATIENT-LVL II: ICD-10-PCS | Mod: PBBFAC,,, | Performed by: DERMATOLOGY

## 2019-12-09 PROCEDURE — 99212 OFFICE O/P EST SF 10 MIN: CPT | Mod: PBBFAC,25 | Performed by: DERMATOLOGY

## 2019-12-09 PROCEDURE — 17003 DESTRUCT PREMALG LES 2-14: CPT | Mod: PBBFAC | Performed by: DERMATOLOGY

## 2019-12-09 RX ORDER — MUPIROCIN 20 MG/G
OINTMENT TOPICAL 2 TIMES DAILY
Qty: 30 G | Refills: 1 | Status: SHIPPED | OUTPATIENT
Start: 2019-12-09

## 2019-12-09 RX ORDER — DOXYCYCLINE 100 MG/1
CAPSULE ORAL
Qty: 20 CAPSULE | Refills: 0 | Status: SHIPPED | OUTPATIENT
Start: 2019-12-09

## 2019-12-09 NOTE — PATIENT INSTRUCTIONS

## 2019-12-09 NOTE — PROGRESS NOTES
Subjective:       Patient ID:  Jesse Hernandez Jr. is a 69 y.o. male who presents for   Chief Complaint   Patient presents with    Skin Check     3 month     Hx of heart transplant in 2003, currently on prograf and prednisone 5 mg daily, SCC of the left antihelix (s/p Mohs by Dr. 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  Denies bleeding, tender, growing, or concerning lesions. This is a high risk patient here to check for the development of new lesions.        Review of Systems   Constitutional: Negative for fever and fatigue.   Gastrointestinal: Negative for nausea and vomiting.   Skin: Negative for daily sunscreen use, activity-related sunscreen use and recent sunburn.   Hematologic/Lymphatic: Does not bruise/bleed easily.        Objective:    Physical Exam   Constitutional: He appears well-developed and well-nourished. No distress.   Neurological: He is alert and oriented to person, place, and time. He is not disoriented.   Psychiatric: He has a normal mood and affect.   Skin:   Areas Examined (abnormalities noted in diagram):   Scalp / Hair Palpated and Inspected  Head / Face Inspection Performed  Neck Inspection Performed  Chest / Axilla Inspection Performed  Abdomen Inspection Performed  Back Inspection Performed  RUE Inspected  LUE Inspection Performed  RLE Inspected  LLE Inspection Performed  Nails and Digits Inspection Performed                   Diagram Legend     Erythematous scaling macule/papule c/w actinic keratosis       Vascular papule c/w angioma      Pigmented verrucoid papule/plaque c/w seborrheic keratosis      Yellow umbilicated papule c/w sebaceous hyperplasia      Irregularly shaped tan macule c/w lentigo     1-2 mm smooth white papules consistent with Milia      Movable subcutaneous cyst with punctum c/w epidermal inclusion cyst      Subcutaneous movable cyst c/w pilar cyst      Firm pink to  brown papule c/w dermatofibroma      Pedunculated fleshy papule(s) c/w skin tag(s)      Evenly pigmented macule c/w junctional nevus     Mildly variegated pigmented, slightly irregular-bordered macule c/w mildly atypical nevus      Flesh colored to evenly pigmented papule c/w intradermal nevus       Pink pearly papule/plaque c/w basal cell carcinoma      Erythematous hyperkeratotic cursted plaque c/w SCC      Surgical scar with no sign of skin cancer recurrence      Open and closed comedones      Inflammatory papules and pustules      Verrucoid papule consistent consistent with wart     Erythematous eczematous patches and plaques     Dystrophic onycholytic nail with subungual debris c/w onychomycosis     Umbilicated papule    Erythematous-base heme-crusted tan verrucoid plaque consistent with inflamed seborrheic keratosis     Erythematous Silvery Scaling Plaque c/w Psoriasis     See annotation      Assessment / Plan:        Scar conditions/skin fibrosis  Screening, malignant neoplasm, skin  History of skin cancer  Scar of the left antihelix (swelling, infected), left parietal scalp, right eyebrow, hx of NMSC.  No evidence of recurrence on physical exam today.  Continue routine skin surveillance. Daily sunscreen advised.    Skin infection  -     Aerobic culture  -     doxycycline (MONODOX) 100 MG capsule; Take twice a day with food. May cause upset stomach  Dispense: 20 capsule; Refill: 0  -     mupirocin (BACTROBAN) 2 % ointment; Apply topically 2 (two) times daily. Apply with Q-tip  Dispense: 30 g; Refill: 1  -      Of the left antihelix, s/p Mohs 3 weeks ago per patient.  Will check culture and notify Dr. Marquez. Will start above meds. Side effect profile of doxy reviewed including increased sun sensitivity and upset stomach.    Actinic keratoses  Cryosurgery Procedure Note    The patient is informed of the precancerous quality and need for treatment of these lesions. After risks, benefits and alternatives  explained, including blistering, pain, hyper- and hypopigmentation, patient verbally consents to cryotherapy to precancerous lesions. Liquid nitrogen cryosurgery is applied to the 8 actinic keratoses, as detailed in the physical exam, to produce a freeze injury. The patient is aware that blisters may form and is instructed on wound care with gentle cleansing and use of vaseline ointment to keep moist until healed. The patient is supplied a handout on cryosurgery and is instructed to call if lesions do not completely resolve.           Follow up in about 3 months (around 3/9/2020).

## 2019-12-09 NOTE — TELEPHONE ENCOUNTER
S/w Ann at Dr. Moss office to advise her that Mr. Jesse lynn was infected, told her that Dr. George will be placing pt on doxy and mupirocin. She said that is there go to and she will also put that in the chart.

## 2019-12-10 LAB — TACROLIMUS BLD-MCNC: 7.1 NG/ML (ref 5–15)

## 2019-12-11 ENCOUNTER — TELEPHONE (OUTPATIENT)
Dept: TRANSPLANT | Facility: CLINIC | Age: 69
End: 2019-12-11

## 2019-12-11 LAB — BACTERIA SPEC AEROBE CULT: NORMAL

## 2019-12-11 NOTE — TELEPHONE ENCOUNTER
Spoke to pt's wife regarding his lab. Kidney and liver are stable. Tacrolimus level is therapeutic. A1C still elevated but stable and Triglycerides actually better though elevated. They are following a low sugar and low salt diet.

## 2020-01-01 ENCOUNTER — TELEPHONE (OUTPATIENT)
Dept: CARDIOLOGY | Facility: CLINIC | Age: 70
End: 2020-01-01

## 2020-01-01 ENCOUNTER — OFFICE VISIT (OUTPATIENT)
Dept: DERMATOLOGY | Facility: CLINIC | Age: 70
End: 2020-01-01
Payer: MEDICARE

## 2020-01-01 ENCOUNTER — HOSPITAL ENCOUNTER (OUTPATIENT)
Dept: RADIOLOGY | Facility: HOSPITAL | Age: 70
Discharge: HOME | End: 2020-09-02
Attending: INTERNAL MEDICINE
Payer: MEDICARE

## 2020-01-01 ENCOUNTER — HOSPITAL ENCOUNTER (OUTPATIENT)
Dept: CARDIOLOGY | Facility: HOSPITAL | Age: 70
Discharge: HOME | End: 2020-09-04
Attending: INTERNAL MEDICINE
Payer: MEDICARE

## 2020-01-01 ENCOUNTER — TELEPHONE (OUTPATIENT)
Dept: TRANSPLANT | Facility: CLINIC | Age: 70
End: 2020-01-01

## 2020-01-01 ENCOUNTER — DOCUMENTATION ONLY (OUTPATIENT)
Dept: TRANSPLANT | Facility: CLINIC | Age: 70
End: 2020-01-01

## 2020-01-01 ENCOUNTER — OFFICE VISIT (OUTPATIENT)
Dept: TRANSPLANT | Facility: CLINIC | Age: 70
End: 2020-01-01
Payer: MEDICARE

## 2020-01-01 ENCOUNTER — TELEPHONE (OUTPATIENT)
Dept: DERMATOLOGY | Facility: CLINIC | Age: 70
End: 2020-01-01

## 2020-01-01 ENCOUNTER — OFFICE VISIT (OUTPATIENT)
Dept: UROLOGY | Facility: CLINIC | Age: 70
End: 2020-01-01
Payer: MEDICARE

## 2020-01-01 VITALS
BODY MASS INDEX: 24.45 KG/M2 | TEMPERATURE: 98 F | WEIGHT: 170.44 LBS | DIASTOLIC BLOOD PRESSURE: 70 MMHG | DIASTOLIC BLOOD PRESSURE: 68 MMHG | SYSTOLIC BLOOD PRESSURE: 125 MMHG | HEART RATE: 92 BPM | SYSTOLIC BLOOD PRESSURE: 122 MMHG

## 2020-01-01 VITALS
HEART RATE: 108 BPM | BODY MASS INDEX: 24.05 KG/M2 | SYSTOLIC BLOOD PRESSURE: 142 MMHG | HEIGHT: 70 IN | DIASTOLIC BLOOD PRESSURE: 68 MMHG | WEIGHT: 168 LBS

## 2020-01-01 DIAGNOSIS — L57.0 ACTINIC KERATOSES: ICD-10-CM

## 2020-01-01 DIAGNOSIS — I25.750 CORONARY ARTERY DISEASE INVOLVING NATIVE ARTERY OF TRANSPLANTED HEART WITH UNSTABLE ANGINA PECTORIS: ICD-10-CM

## 2020-01-01 DIAGNOSIS — I10 HYPERTENSION, UNSPECIFIED TYPE: ICD-10-CM

## 2020-01-01 DIAGNOSIS — I13.0 BENIGN HYPERTENSIVE HEART AND KIDNEY DISEASE WITH CHF, NYHA CLASS 1 AND CKD STAGE 3: ICD-10-CM

## 2020-01-01 DIAGNOSIS — T86.290 CARDIAC ALLOGRAFT VASCULOPATHY: ICD-10-CM

## 2020-01-01 DIAGNOSIS — Z79.899 ENCOUNTER FOR LONG-TERM (CURRENT) USE OF MEDICATIONS: ICD-10-CM

## 2020-01-01 DIAGNOSIS — Z79.52 LONG TERM CURRENT USE OF SYSTEMIC STEROIDS: ICD-10-CM

## 2020-01-01 DIAGNOSIS — L90.5 SCAR CONDITIONS/SKIN FIBROSIS: Primary | ICD-10-CM

## 2020-01-01 DIAGNOSIS — Z94.1 STATUS POST HEART TRANSPLANT: ICD-10-CM

## 2020-01-01 DIAGNOSIS — Z94.1 HEART REPLACED BY TRANSPLANT: Primary | ICD-10-CM

## 2020-01-01 DIAGNOSIS — N40.0 BENIGN PROSTATIC HYPERPLASIA, UNSPECIFIED WHETHER LOWER URINARY TRACT SYMPTOMS PRESENT: ICD-10-CM

## 2020-01-01 DIAGNOSIS — I10 ESSENTIAL HYPERTENSION: ICD-10-CM

## 2020-01-01 DIAGNOSIS — E11.22 TYPE 2 DIABETES MELLITUS WITH STAGE 2 CHRONIC KIDNEY DISEASE, WITHOUT LONG-TERM CURRENT USE OF INSULIN: ICD-10-CM

## 2020-01-01 DIAGNOSIS — D48.5 NEOPLASM OF UNCERTAIN BEHAVIOR OF SKIN: ICD-10-CM

## 2020-01-01 DIAGNOSIS — N18.2 TYPE 2 DIABETES MELLITUS WITH STAGE 2 CHRONIC KIDNEY DISEASE, WITHOUT LONG-TERM CURRENT USE OF INSULIN: ICD-10-CM

## 2020-01-01 DIAGNOSIS — N28.9 RENAL INSUFFICIENCY: ICD-10-CM

## 2020-01-01 DIAGNOSIS — Z94.1 HEART REPLACED BY TRANSPLANT: ICD-10-CM

## 2020-01-01 DIAGNOSIS — Z12.5 PROSTATE CANCER SCREENING: Primary | ICD-10-CM

## 2020-01-01 DIAGNOSIS — E78.2 MIXED HYPERLIPIDEMIA: ICD-10-CM

## 2020-01-01 DIAGNOSIS — N18.30 BENIGN HYPERTENSIVE HEART AND KIDNEY DISEASE WITH CHF, NYHA CLASS 1 AND CKD STAGE 3: ICD-10-CM

## 2020-01-01 DIAGNOSIS — R06.02 SHORTNESS OF BREATH: ICD-10-CM

## 2020-01-01 DIAGNOSIS — E03.9 HYPOTHYROIDISM, UNSPECIFIED TYPE: ICD-10-CM

## 2020-01-01 DIAGNOSIS — Z94.1 STATUS POST HEART TRANSPLANTATION: Primary | ICD-10-CM

## 2020-01-01 DIAGNOSIS — Z12.83 SCREENING, MALIGNANT NEOPLASM, SKIN: ICD-10-CM

## 2020-01-01 DIAGNOSIS — Z85.828 HISTORY OF SKIN CANCER: ICD-10-CM

## 2020-01-01 LAB
BILIRUB SERPL-MCNC: ABNORMAL MG/DL
BLOOD URINE, POC: ABNORMAL
CFR FLOW - ANTERIOR: 1.46
CFR FLOW - INFERIOR: 1.33
CFR FLOW - LATERAL: 1.54
CFR FLOW - MAX: 2.34
CFR FLOW - MIN: 1.21
CFR FLOW - SEPTAL: 1.69
CFR FLOW - WHOLE HEART: 1.51
CLARITY, POC UA: CLEAR
COLOR, POC UA: YELLOW
CV PHARM DOSE: 43.8 MG
CV STRESS BASE HR: 89 BPM
DIASTOLIC BLOOD PRESSURE: 79 MMHG
END DIASTOLIC INDEX-HIGH: 170 ML/M2
END SYSTOLIC INDEX-HIGH: 70 ML/M2
FINAL PATHOLOGIC DIAGNOSIS: NORMAL
GLUCOSE UR QL STRIP: ABNORMAL
GROSS: NORMAL
KETONES UR QL STRIP: ABNORMAL
LEUKOCYTE ESTERASE URINE, POC: ABNORMAL
MICROSCOPIC EXAM: NORMAL
NITRITE, POC UA: ABNORMAL
OHS CV CPX 85 PERCENT MAX PREDICTED HEART RATE MALE: 126
OHS CV CPX MAX PREDICTED HEART RATE: 148
OHS CV CPX PATIENT IS FEMALE: 0
OHS CV CPX PATIENT IS MALE: 1
OHS CV CPX PEAK DIASTOLIC BLOOD PRESSURE: 67 MMHG
OHS CV CPX PEAK HEAR RATE: 94 BPM
OHS CV CPX PEAK RATE PRESSURE PRODUCT: NORMAL
OHS CV CPX PEAK SYSTOLIC BLOOD PRESSURE: 125 MMHG
OHS CV CPX PERCENT MAX PREDICTED HEART RATE ACHIEVED: 64
OHS CV CPX RATE PRESSURE PRODUCT PRESENTING: NORMAL
PH, POC UA: 6
PROTEIN, POC: ABNORMAL
REST FLOW - ANTERIOR: 1.74 CC/MIN/G
REST FLOW - INFERIOR: 1.56 CC/MIN/G
REST FLOW - LATERAL: 1.63 CC/MIN/G
REST FLOW - MAX: 2.22 CC/MIN/G
REST FLOW - MIN: 0.94 CC/MIN/G
REST FLOW - SEPTAL: 1.33 CC/MIN/G
REST FLOW - WHOLE HEART: 1.57 CC/MIN/G
RETIRED EF AND QEF - SEE NOTES: 51 %
SPECIFIC GRAVITY, POC UA: 1.01
STRESS FLOW - ANTERIOR: 2.57 CC/MIN/G
STRESS FLOW - INFERIOR: 2.07 CC/MIN/G
STRESS FLOW - LATERAL: 2.51 CC/MIN/G
STRESS FLOW - MAX: 3.47 CC/MIN/G
STRESS FLOW - MIN: 1.57 CC/MIN/G
STRESS FLOW - SEPTAL: 2.23 CC/MIN/G
STRESS FLOW - WHOLE HEART: 2.35 CC/MIN/G
SYSTOLIC BLOOD PRESSURE: 140 MMHG
UROBILINOGEN, POC UA: ABNORMAL

## 2020-01-01 PROCEDURE — 99999 PR PBB SHADOW E&M-EST. PATIENT-LVL IV: ICD-10-PCS | Mod: PBBFAC,,, | Performed by: DERMATOLOGY

## 2020-01-01 PROCEDURE — 88342 IMHCHEM/IMCYTCHM 1ST ANTB: CPT | Mod: 26,,, | Performed by: DERMATOLOGY

## 2020-01-01 PROCEDURE — 78492 MYOCRD IMG PET MLT RST&STRS: CPT | Mod: 26,,, | Performed by: INTERNAL MEDICINE

## 2020-01-01 PROCEDURE — 88305 TISSUE EXAM BY PATHOLOGIST: CPT | Mod: 26,,, | Performed by: DERMATOLOGY

## 2020-01-01 PROCEDURE — 99999 PR PBB SHADOW E&M-EST. PATIENT-LVL IV: CPT | Mod: PBBFAC,,, | Performed by: INTERNAL MEDICINE

## 2020-01-01 PROCEDURE — 81002 URINALYSIS NONAUTO W/O SCOPE: CPT | Mod: PBBFAC | Performed by: UROLOGY

## 2020-01-01 PROCEDURE — A9555 RB82 RUBIDIUM: HCPCS

## 2020-01-01 PROCEDURE — 11102 TANGNTL BX SKIN SINGLE LES: CPT | Mod: S$PBB,,, | Performed by: DERMATOLOGY

## 2020-01-01 PROCEDURE — 17000 PR DESTRUCTION(LASER SURGERY,CRYOSURGERY,CHEMOSURGERY),PREMALIGNANT LESIONS,FIRST LESION: ICD-10-PCS | Mod: 59,S$PBB,, | Performed by: DERMATOLOGY

## 2020-01-01 PROCEDURE — 71046 X-RAY EXAM CHEST 2 VIEWS: CPT | Mod: 26,,, | Performed by: RADIOLOGY

## 2020-01-01 PROCEDURE — 17003 DESTRUCT PREMALG LES 2-14: CPT | Mod: S$PBB,,, | Performed by: DERMATOLOGY

## 2020-01-01 PROCEDURE — 99213 PR OFFICE/OUTPT VISIT, EST, LEVL III, 20-29 MIN: ICD-10-PCS | Mod: 25,S$PBB,, | Performed by: DERMATOLOGY

## 2020-01-01 PROCEDURE — 99214 OFFICE O/P EST MOD 30 MIN: CPT | Mod: PBBFAC,25 | Performed by: DERMATOLOGY

## 2020-01-01 PROCEDURE — 99214 OFFICE O/P EST MOD 30 MIN: CPT | Mod: S$PBB,,, | Performed by: UROLOGY

## 2020-01-01 PROCEDURE — 78434 CARDIAC PET SCAN STRESS (CUPID ONLY): ICD-10-PCS | Mod: ,,, | Performed by: INTERNAL MEDICINE

## 2020-01-01 PROCEDURE — 99213 OFFICE O/P EST LOW 20 MIN: CPT | Mod: PBBFAC | Performed by: UROLOGY

## 2020-01-01 PROCEDURE — 78434 AQMBF PET REST & RX STRESS: CPT | Mod: ,,, | Performed by: INTERNAL MEDICINE

## 2020-01-01 PROCEDURE — 71046 X-RAY EXAM CHEST 2 VIEWS: CPT | Mod: TC

## 2020-01-01 PROCEDURE — 88305 TISSUE EXAM BY PATHOLOGIST: ICD-10-PCS | Mod: 26,,, | Performed by: DERMATOLOGY

## 2020-01-01 PROCEDURE — 99999 PR PBB SHADOW E&M-EST. PATIENT-LVL III: ICD-10-PCS | Mod: PBBFAC,,, | Performed by: UROLOGY

## 2020-01-01 PROCEDURE — 78434 AQMBF PET REST & RX STRESS: CPT

## 2020-01-01 PROCEDURE — 93018 CV STRESS TEST I&R ONLY: CPT | Mod: ,,, | Performed by: INTERNAL MEDICINE

## 2020-01-01 PROCEDURE — 88342 IMHCHEM/IMCYTCHM 1ST ANTB: CPT | Performed by: DERMATOLOGY

## 2020-01-01 PROCEDURE — 88305 TISSUE EXAM BY PATHOLOGIST: CPT | Performed by: DERMATOLOGY

## 2020-01-01 PROCEDURE — 17000 DESTRUCT PREMALG LESION: CPT | Mod: 59,PBBFAC | Performed by: DERMATOLOGY

## 2020-01-01 PROCEDURE — 17000 DESTRUCT PREMALG LESION: CPT | Mod: 59,S$PBB,, | Performed by: DERMATOLOGY

## 2020-01-01 PROCEDURE — 71046 XR CHEST PA AND LATERAL: ICD-10-PCS | Mod: 26,,, | Performed by: RADIOLOGY

## 2020-01-01 PROCEDURE — 17003 DESTRUCT PREMALG LES 2-14: CPT | Mod: PBBFAC | Performed by: DERMATOLOGY

## 2020-01-01 PROCEDURE — 99215 OFFICE O/P EST HI 40 MIN: CPT | Mod: S$PBB,,, | Performed by: INTERNAL MEDICINE

## 2020-01-01 PROCEDURE — 93016 CV STRESS TEST SUPVJ ONLY: CPT | Mod: ,,, | Performed by: INTERNAL MEDICINE

## 2020-01-01 PROCEDURE — 11102 PR TANGENTIAL BIOPSY, SKIN, SINGLE LESION: ICD-10-PCS | Mod: S$PBB,,, | Performed by: DERMATOLOGY

## 2020-01-01 PROCEDURE — 99213 OFFICE O/P EST LOW 20 MIN: CPT | Mod: 25,S$PBB,, | Performed by: DERMATOLOGY

## 2020-01-01 PROCEDURE — 99999 PR PBB SHADOW E&M-EST. PATIENT-LVL III: CPT | Mod: PBBFAC,,, | Performed by: UROLOGY

## 2020-01-01 PROCEDURE — 93018 CARDIAC PET SCAN STRESS (CUPID ONLY): ICD-10-PCS | Mod: ,,, | Performed by: INTERNAL MEDICINE

## 2020-01-01 PROCEDURE — 99215 PR OFFICE/OUTPT VISIT, EST, LEVL V, 40-54 MIN: ICD-10-PCS | Mod: S$PBB,,, | Performed by: INTERNAL MEDICINE

## 2020-01-01 PROCEDURE — 99999 PR PBB SHADOW E&M-EST. PATIENT-LVL IV: CPT | Mod: PBBFAC,,, | Performed by: DERMATOLOGY

## 2020-01-01 PROCEDURE — 78492 CARDIAC PET SCAN STRESS (CUPID ONLY): ICD-10-PCS | Mod: 26,,, | Performed by: INTERNAL MEDICINE

## 2020-01-01 PROCEDURE — 11102 TANGNTL BX SKIN SINGLE LES: CPT | Mod: PBBFAC | Performed by: DERMATOLOGY

## 2020-01-01 PROCEDURE — 93016 CARDIAC PET SCAN STRESS (CUPID ONLY): ICD-10-PCS | Mod: ,,, | Performed by: INTERNAL MEDICINE

## 2020-01-01 PROCEDURE — 99999 PR PBB SHADOW E&M-EST. PATIENT-LVL IV: ICD-10-PCS | Mod: PBBFAC,,, | Performed by: INTERNAL MEDICINE

## 2020-01-01 PROCEDURE — 99214 PR OFFICE/OUTPT VISIT, EST, LEVL IV, 30-39 MIN: ICD-10-PCS | Mod: S$PBB,,, | Performed by: UROLOGY

## 2020-01-01 PROCEDURE — 99214 OFFICE O/P EST MOD 30 MIN: CPT | Mod: PBBFAC,25 | Performed by: INTERNAL MEDICINE

## 2020-01-01 PROCEDURE — 88342 CHG IMMUNOCYTOCHEMISTRY: ICD-10-PCS | Mod: 26,,, | Performed by: DERMATOLOGY

## 2020-01-01 PROCEDURE — 17003 DESTRUCTION, PREMALIGNANT LESIONS; SECOND THROUGH 14 LESIONS: ICD-10-PCS | Mod: S$PBB,,, | Performed by: DERMATOLOGY

## 2020-01-01 RX ORDER — FINASTERIDE 5 MG/1
5 TABLET, FILM COATED ORAL DAILY
Qty: 30 TABLET | Refills: 11 | Status: SHIPPED | OUTPATIENT
Start: 2020-01-01

## 2020-01-01 RX ORDER — MAGNESIUM 200 MG
400 TABLET ORAL ONCE
COMMUNITY

## 2020-01-01 RX ORDER — DIPYRIDAMOLE 5 MG/ML
43.78 INJECTION INTRAVENOUS ONCE
Status: COMPLETED | OUTPATIENT
Start: 2020-01-01 | End: 2020-01-01

## 2020-01-01 RX ORDER — TAMSULOSIN HYDROCHLORIDE 0.4 MG/1
1 CAPSULE ORAL DAILY
Qty: 30 CAPSULE | Refills: 11 | Status: SHIPPED | OUTPATIENT
Start: 2020-01-01

## 2020-01-01 RX ORDER — AMLODIPINE BESYLATE 5 MG/1
TABLET ORAL
Qty: 90 TABLET | Refills: 11 | Status: SHIPPED | OUTPATIENT
Start: 2020-01-01

## 2020-01-01 RX ORDER — TACROLIMUS 1 MG/1
CAPSULE ORAL
Qty: 450 CAPSULE | Refills: 0 | Status: SHIPPED | OUTPATIENT
Start: 2020-01-01

## 2020-01-01 RX ADMIN — DIPYRIDAMOLE 43.8 MG: 5 INJECTION INTRAVENOUS at 09:09

## 2020-03-10 ENCOUNTER — LAB VISIT (OUTPATIENT)
Dept: LAB | Facility: HOSPITAL | Age: 70
End: 2020-03-10
Attending: INTERNAL MEDICINE
Payer: MEDICARE

## 2020-03-10 ENCOUNTER — OFFICE VISIT (OUTPATIENT)
Dept: DERMATOLOGY | Facility: CLINIC | Age: 70
End: 2020-03-10
Payer: MEDICARE

## 2020-03-10 DIAGNOSIS — T86.290 VASCULOPATHY OF CARDIAC ALLOGRAFT: ICD-10-CM

## 2020-03-10 DIAGNOSIS — Z85.828 HISTORY OF SKIN CANCER: ICD-10-CM

## 2020-03-10 DIAGNOSIS — N28.9 RENAL INSUFFICIENCY: ICD-10-CM

## 2020-03-10 DIAGNOSIS — L57.0 ACTINIC KERATOSES: ICD-10-CM

## 2020-03-10 DIAGNOSIS — Z12.83 SCREENING, MALIGNANT NEOPLASM, SKIN: ICD-10-CM

## 2020-03-10 DIAGNOSIS — T86.20 COMPLICATION OF HEART TRANSPLANT, UNSPECIFIED COMPLICATION: ICD-10-CM

## 2020-03-10 DIAGNOSIS — Z79.899 ENCOUNTER FOR LONG-TERM (CURRENT) USE OF MEDICATIONS: ICD-10-CM

## 2020-03-10 DIAGNOSIS — Z94.1 STATUS POST HEART TRANSPLANT: ICD-10-CM

## 2020-03-10 DIAGNOSIS — E13.9 POST-TRANSPLANT DIABETES MELLITUS: ICD-10-CM

## 2020-03-10 DIAGNOSIS — E78.2 MIXED HYPERLIPIDEMIA: ICD-10-CM

## 2020-03-10 DIAGNOSIS — L90.5 SCAR CONDITIONS/SKIN FIBROSIS: Primary | ICD-10-CM

## 2020-03-10 DIAGNOSIS — L82.1 SEBORRHEIC KERATOSIS: ICD-10-CM

## 2020-03-10 LAB
ALBUMIN SERPL BCP-MCNC: 3.7 G/DL (ref 3.5–5.2)
ALP SERPL-CCNC: 65 U/L (ref 55–135)
ALT SERPL W/O P-5'-P-CCNC: 24 U/L (ref 10–44)
ANION GAP SERPL CALC-SCNC: 11 MMOL/L (ref 8–16)
AST SERPL-CCNC: 19 U/L (ref 10–40)
BASOPHILS # BLD AUTO: 0.04 K/UL (ref 0–0.2)
BASOPHILS NFR BLD: 0.3 % (ref 0–1.9)
BILIRUB SERPL-MCNC: 0.8 MG/DL (ref 0.1–1)
BNP SERPL-MCNC: 54 PG/ML (ref 0–99)
BUN SERPL-MCNC: 28 MG/DL (ref 8–23)
CALCIUM SERPL-MCNC: 8.8 MG/DL (ref 8.7–10.5)
CHLORIDE SERPL-SCNC: 106 MMOL/L (ref 95–110)
CHOLEST SERPL-MCNC: 115 MG/DL (ref 120–199)
CHOLEST/HDLC SERPL: 2.9 {RATIO} (ref 2–5)
CO2 SERPL-SCNC: 24 MMOL/L (ref 23–29)
CREAT SERPL-MCNC: 1.5 MG/DL (ref 0.5–1.4)
DIFFERENTIAL METHOD: ABNORMAL
EOSINOPHIL # BLD AUTO: 0.3 K/UL (ref 0–0.5)
EOSINOPHIL NFR BLD: 2 % (ref 0–8)
ERYTHROCYTE [DISTWIDTH] IN BLOOD BY AUTOMATED COUNT: 14.2 % (ref 11.5–14.5)
EST. GFR  (AFRICAN AMERICAN): 53 ML/MIN/1.73 M^2
EST. GFR  (NON AFRICAN AMERICAN): 45.9 ML/MIN/1.73 M^2
ESTIMATED AVG GLUCOSE: 140 MG/DL (ref 68–131)
GLUCOSE SERPL-MCNC: 120 MG/DL (ref 70–110)
HBA1C MFR BLD HPLC: 6.5 % (ref 4–5.6)
HCT VFR BLD AUTO: 46.3 % (ref 40–54)
HDLC SERPL-MCNC: 39 MG/DL (ref 40–75)
HDLC SERPL: 33.9 % (ref 20–50)
HGB BLD-MCNC: 14.8 G/DL (ref 14–18)
IMM GRANULOCYTES # BLD AUTO: 0.08 K/UL (ref 0–0.04)
IMM GRANULOCYTES NFR BLD AUTO: 0.6 % (ref 0–0.5)
LDLC SERPL CALC-MCNC: 38.8 MG/DL (ref 63–159)
LYMPHOCYTES # BLD AUTO: 1.7 K/UL (ref 1–4.8)
LYMPHOCYTES NFR BLD: 12.1 % (ref 18–48)
MAGNESIUM SERPL-MCNC: 1.5 MG/DL (ref 1.6–2.6)
MCH RBC QN AUTO: 31.4 PG (ref 27–31)
MCHC RBC AUTO-ENTMCNC: 32 G/DL (ref 32–36)
MCV RBC AUTO: 98 FL (ref 82–98)
MONOCYTES # BLD AUTO: 1.2 K/UL (ref 0.3–1)
MONOCYTES NFR BLD: 8.3 % (ref 4–15)
NEUTROPHILS # BLD AUTO: 10.7 K/UL (ref 1.8–7.7)
NEUTROPHILS NFR BLD: 76.7 % (ref 38–73)
NONHDLC SERPL-MCNC: 76 MG/DL
NRBC BLD-RTO: 0 /100 WBC
PLATELET # BLD AUTO: 319 K/UL (ref 150–350)
PMV BLD AUTO: 9.6 FL (ref 9.2–12.9)
POTASSIUM SERPL-SCNC: 5 MMOL/L (ref 3.5–5.1)
PROT SERPL-MCNC: 6.9 G/DL (ref 6–8.4)
RBC # BLD AUTO: 4.71 M/UL (ref 4.6–6.2)
SODIUM SERPL-SCNC: 141 MMOL/L (ref 136–145)
TRIGL SERPL-MCNC: 186 MG/DL (ref 30–150)
WBC # BLD AUTO: 13.88 K/UL (ref 3.9–12.7)

## 2020-03-10 PROCEDURE — 36415 COLL VENOUS BLD VENIPUNCTURE: CPT

## 2020-03-10 PROCEDURE — 83036 HEMOGLOBIN GLYCOSYLATED A1C: CPT

## 2020-03-10 PROCEDURE — 17000 DESTRUCT PREMALG LESION: CPT | Mod: S$PBB,,, | Performed by: DERMATOLOGY

## 2020-03-10 PROCEDURE — 99999 PR PBB SHADOW E&M-EST. PATIENT-LVL III: CPT | Mod: PBBFAC,,, | Performed by: DERMATOLOGY

## 2020-03-10 PROCEDURE — 85025 COMPLETE CBC W/AUTO DIFF WBC: CPT

## 2020-03-10 PROCEDURE — 80053 COMPREHEN METABOLIC PANEL: CPT

## 2020-03-10 PROCEDURE — 17003 DESTRUCT PREMALG LES 2-14: CPT | Mod: S$PBB,,, | Performed by: DERMATOLOGY

## 2020-03-10 PROCEDURE — 17000 DESTRUCT PREMALG LESION: CPT | Mod: PBBFAC | Performed by: DERMATOLOGY

## 2020-03-10 PROCEDURE — 17003 DESTRUCT PREMALG LES 2-14: CPT | Mod: PBBFAC | Performed by: DERMATOLOGY

## 2020-03-10 PROCEDURE — 83880 ASSAY OF NATRIURETIC PEPTIDE: CPT

## 2020-03-10 PROCEDURE — 99999 PR PBB SHADOW E&M-EST. PATIENT-LVL III: ICD-10-PCS | Mod: PBBFAC,,, | Performed by: DERMATOLOGY

## 2020-03-10 PROCEDURE — 80197 ASSAY OF TACROLIMUS: CPT

## 2020-03-10 PROCEDURE — 99213 PR OFFICE/OUTPT VISIT, EST, LEVL III, 20-29 MIN: ICD-10-PCS | Mod: 25,S$PBB,, | Performed by: DERMATOLOGY

## 2020-03-10 PROCEDURE — 17000 PR DESTRUCTION(LASER SURGERY,CRYOSURGERY,CHEMOSURGERY),PREMALIGNANT LESIONS,FIRST LESION: ICD-10-PCS | Mod: S$PBB,,, | Performed by: DERMATOLOGY

## 2020-03-10 PROCEDURE — 83735 ASSAY OF MAGNESIUM: CPT

## 2020-03-10 PROCEDURE — 17003 DESTRUCTION, PREMALIGNANT LESIONS; SECOND THROUGH 14 LESIONS: ICD-10-PCS | Mod: S$PBB,,, | Performed by: DERMATOLOGY

## 2020-03-10 PROCEDURE — 99213 OFFICE O/P EST LOW 20 MIN: CPT | Mod: PBBFAC,25 | Performed by: DERMATOLOGY

## 2020-03-10 PROCEDURE — 80061 LIPID PANEL: CPT

## 2020-03-10 PROCEDURE — 99213 OFFICE O/P EST LOW 20 MIN: CPT | Mod: 25,S$PBB,, | Performed by: DERMATOLOGY

## 2020-03-10 NOTE — PATIENT INSTRUCTIONS

## 2020-03-10 NOTE — PROGRESS NOTES
Subjective:       Patient ID:  Jesse Hernandez Jr. is a 70 y.o. male who presents for   Chief Complaint   Patient presents with    Skin Check     Hx of heart transplant in 2003, currently on prograf and prednisone 5 mg daily, SCC of the left antihelix (s/p Mohs by Dr. 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  Denies bleeding, tender, growing, or concerning lesions. This is a high risk patient here to check for the development of new lesions.        Review of Systems   Constitutional: Negative for fever and chills.   Gastrointestinal: Negative for nausea and vomiting.   Skin: Positive for activity-related sunscreen use. Negative for daily sunscreen use and recent sunburn.   Hematologic/Lymphatic: Does not bruise/bleed easily.        Objective:    Physical Exam   Constitutional: He appears well-developed and well-nourished. No distress.   Neurological: He is alert and oriented to person, place, and time. He is not disoriented.   Psychiatric: He has a normal mood and affect.   Skin:   Areas Examined (abnormalities noted in diagram):   Scalp / Hair Palpated and Inspected  Head / Face Inspection Performed  Neck Inspection Performed  Chest / Axilla Inspection Performed  Abdomen Inspection Performed  Genitals / Buttocks / Groin Inspection Performed  Back Inspection Performed  RUE Inspected  LUE Inspection Performed  RLE Inspected  LLE Inspection Performed  Nails and Digits Inspection Performed                   Diagram Legend     Erythematous scaling macule/papule c/w actinic keratosis       Vascular papule c/w angioma      Pigmented verrucoid papule/plaque c/w seborrheic keratosis      Yellow umbilicated papule c/w sebaceous hyperplasia      Irregularly shaped tan macule c/w lentigo     1-2 mm smooth white papules consistent with Milia      Movable subcutaneous cyst with punctum c/w epidermal inclusion cyst       Subcutaneous movable cyst c/w pilar cyst      Firm pink to brown papule c/w dermatofibroma      Pedunculated fleshy papule(s) c/w skin tag(s)      Evenly pigmented macule c/w junctional nevus     Mildly variegated pigmented, slightly irregular-bordered macule c/w mildly atypical nevus      Flesh colored to evenly pigmented papule c/w intradermal nevus       Pink pearly papule/plaque c/w basal cell carcinoma      Erythematous hyperkeratotic cursted plaque c/w SCC      Surgical scar with no sign of skin cancer recurrence      Open and closed comedones      Inflammatory papules and pustules      Verrucoid papule consistent consistent with wart     Erythematous eczematous patches and plaques     Dystrophic onycholytic nail with subungual debris c/w onychomycosis     Umbilicated papule    Erythematous-base heme-crusted tan verrucoid plaque consistent with inflamed seborrheic keratosis     Erythematous Silvery Scaling Plaque c/w Psoriasis     See annotation      Assessment / Plan:        Scar conditions/skin fibrosis  Screening, malignant neoplasm, skin  History of skin cancer  Scar of several sites, hx of NMSC.  No evidence of recurrence on physical exam today.  Continue routine skin surveillance. Daily sunscreen advised.    Seborrheic keratosis  Reassurance given. Discussed diagnosis and that lesions are benign.  AAD handout given.     Actinic keratoses  Cryosurgery Procedure Note    The patient is informed of the precancerous quality and need for treatment of these lesions. After risks, benefits and alternatives explained, including blistering, pain, hyper- and hypopigmentation, patient verbally consents to cryotherapy to precancerous lesions. Liquid nitrogen cryosurgery is applied to the 5 actinic keratoses, as detailed in the physical exam, to produce a freeze injury. The patient is aware that blisters may form and is instructed on wound care with gentle cleansing and use of vaseline ointment to keep moist until  healed. The patient is supplied a handout on cryosurgery and is instructed to call if lesions do not completely resolve.         Follow up in about 6 months (around 9/10/2020).

## 2020-03-11 LAB — TACROLIMUS BLD-MCNC: 17.5 NG/ML (ref 5–15)

## 2020-03-12 ENCOUNTER — TELEPHONE (OUTPATIENT)
Dept: TRANSPLANT | Facility: CLINIC | Age: 70
End: 2020-03-12

## 2020-03-12 NOTE — TELEPHONE ENCOUNTER
Spoke to pt regarding his lab results. His creatinine had increased to 1.5 from 1.3. Reminded him to hydrate and he said he had just returned from a cruise and did not drink a lot of water. He will increase his intake. Reviewed his lipids which were much improved. He did take his tacrolimus before lab since computer print-out said he could. His last level in December was normal.   Reviewed precautions for COV-19.

## 2020-04-02 NOTE — TELEPHONE ENCOUNTER
Pt asked for a substitute for Zantac as he can't find it in stores.      Hello,     You can use Pepcid/ Famotidine or Protonix/ Pantaprazole as two substitutes. Hope all is well. Are you a new Grandpa?  Denita

## 2020-07-08 NOTE — TELEPHONE ENCOUNTER
Spoke to pt regarding his upcoming annual schedule. He asked that I make the schedule after August 19 as they are traveling to see their new grandchild. He is doing well.

## 2020-07-08 NOTE — TELEPHONE ENCOUNTER
----- Message from Grey Mauro sent at 7/8/2020  9:56 AM CDT -----  Contact: Patient  Pt called and would like to have someone call him back    He would like to reschedule his appt    Pt can be reached at [phone]

## 2020-09-01 NOTE — LETTER
9/17/2020  Jesse TATI Hernandez Jr.  [address] [address]              Dear Mr. Hernandez        Continued care is essential to the health of your skin. You have a pre cancer which can worsen without proper care.This type of skin cancer tends to occur in areas of the skin that receive the most exposure to the sun, like the head and neck. Pre cancer usually grow slowly, and it is rare for them to spread, or metastasize, to nearby lymph nodes or even more distant parts of the body. However, this can occur if the cancer is left untreated, so early detection and treatment is important.    The proper treatment for this is cryotherapy for any residual left in the area. Kindly realize this letter is not meant to alarm you. We only wish to inform your skin cancer and the need for treatment. We encourage you to seek proper care.    Please contact our office as soon as possible to reschedule at [phone].    With best regards,              Carolina George MD

## 2020-09-01 NOTE — PROGRESS NOTES
Subjective:       Patient ID:  Jesse Hernandez Jr. is a 70 y.o. male who presents for   Chief Complaint   Patient presents with    Skin Check     Hx of heart transplant in 2003, currently on prograf and prednisone 5 mg daily, SCC of the left antihelix (s/p Mohs by Dr. 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  Denies bleeding, tender, growing, or concerning lesions. This is a high risk patient here to check for the development of new lesions.         Review of Systems   Constitutional: Negative for fever and chills.   Gastrointestinal: Negative for nausea and vomiting.   Skin: Positive for activity-related sunscreen use. Negative for daily sunscreen use and recent sunburn.   Hematologic/Lymphatic: Does not bruise/bleed easily.        Objective:    Physical Exam   Constitutional: He appears well-developed and well-nourished. No distress.   Neurological: He is alert and oriented to person, place, and time. He is not disoriented.   Psychiatric: He has a normal mood and affect.   Skin:   Areas Examined (abnormalities noted in diagram):   Scalp / Hair Palpated and Inspected  Head / Face Inspection Performed  Neck Inspection Performed  Chest / Axilla Inspection Performed  Abdomen Inspection Performed  Back Inspection Performed  RUE Inspected  LUE Inspection Performed  Nails and Digits Inspection Performed                       Diagram Legend     Erythematous scaling macule/papule c/w actinic keratosis       Vascular papule c/w angioma      Pigmented verrucoid papule/plaque c/w seborrheic keratosis      Yellow umbilicated papule c/w sebaceous hyperplasia      Irregularly shaped tan macule c/w lentigo     1-2 mm smooth white papules consistent with Milia      Movable subcutaneous cyst with punctum c/w epidermal inclusion cyst      Subcutaneous movable cyst c/w pilar cyst      Firm pink to brown papule c/w dermatofibroma       Pedunculated fleshy papule(s) c/w skin tag(s)      Evenly pigmented macule c/w junctional nevus     Mildly variegated pigmented, slightly irregular-bordered macule c/w mildly atypical nevus      Flesh colored to evenly pigmented papule c/w intradermal nevus       Pink pearly papule/plaque c/w basal cell carcinoma      Erythematous hyperkeratotic cursted plaque c/w SCC      Surgical scar with no sign of skin cancer recurrence      Open and closed comedones      Inflammatory papules and pustules      Verrucoid papule consistent consistent with wart     Erythematous eczematous patches and plaques     Dystrophic onycholytic nail with subungual debris c/w onychomycosis     Umbilicated papule    Erythematous-base heme-crusted tan verrucoid plaque consistent with inflamed seborrheic keratosis     Erythematous Silvery Scaling Plaque c/w Psoriasis     See annotation            Assessment / Plan:      Pathology Orders:     Normal Orders This Visit    Specimen to Pathology, Dermatology     Comments:    Number of Specimens:->1  ------------------------->-------------------------  Spec 1 Procedure:->Biopsy  Spec 1 Clinical Impression:->hx of SCC, r/o recurrence  Spec 1 Source:->left antihelix    Questions:    Procedure Type: Dermatology and skin neoplasms    Number of Specimens: 1    ------------------------: -------------------------    Spec 1 Procedure: Biopsy    Spec 1 Clinical Impression: hx of SCC, r/o recurrence    Spec 1 Source: left antihelix    Clinical Information: see above        Scar conditions/skin fibrosis  Screening, malignant neoplasm, skin  History of skin cancer  Scar of the left anti-helix, left parietal scalp and right eyebrow, hx of NMSC.  Concern for possible recurrence with crusted lesion on the left anti-helix on physical exam today.  Continue routine skin surveillance. Daily sunscreen advised.    Actinic keratoses  Cryosurgery Procedure Note    The patient is informed of the precancerous quality and  need for treatment of these lesions. After risks, benefits and alternatives explained, including blistering, pain, hyper- and hypopigmentation, patient verbally consents to cryotherapy to precancerous lesions. Liquid nitrogen cryosurgery is applied to the 7 actinic keratoses, as detailed in the physical exam, to produce a freeze injury. The patient is aware that blisters may form and is instructed on wound care with gentle cleansing and use of vaseline ointment to keep moist until healed. The patient is supplied a handout on cryosurgery and is instructed to call if lesions do not completely resolve.    Neoplasm of uncertain behavior of skin  -     Specimen to Pathology, Dermatology  -     Shave biopsy(-ies) done of 1 site(s).   Patient informed to call for results within 2 weeks if have not received notification via telephone call or [facility]           Follow up for call for results.     PROCEDURE NOTE - SHAVE BIOPSY   Location: see above    After risk, benefits, and alternatives were discussed with the patient, the patient agrees to the procedure by verbal informed consent.  The area(s) were cleansed with alcohol. 2 cc of lidocaine 1% with epinephrine was injected for local anesthesia into each lesion(s).  A sharp dermablade was used to remove part or all of the lesion(s).  The specimen(s) will be sent for tissue pathology.  Hemostasis was obtained with aluminum chloride and/or hyfrecation.  The area(s) were dressed with vaseline ointment and bandaged.  The patient tolerated the procedure well without adverse events.  Wound care instructions were given to the patient on the AVS.  The patient will be notified of pathology results once available. Results will also be available in Epic.

## 2020-09-01 NOTE — PATIENT INSTRUCTIONS
Shave Biopsy Wound Care    Your doctor has performed a shave biopsy today.  A band aid and vaseline ointment has been placed over the site.  This should remain in place for 24 hours.  It is recommended that you keep the area dry for the first 24 hours.  After 24 hours, you may remove the band aid and wash the area with warm soap and water and apply Vaseline jelly.  Many patients prefer to use Neosporin or Bacitracin ointment.  This is acceptable; however, know that you can develop an allergy to this medication even if you have used it safely for years.  It is important to keep the area moist.  Letting it dry out and get air slows healing time, and will worsen the scar.  Band aid is optional after first 24 hours.      If you notice increasing redness, tenderness, pain, or yellow drainage at the biopsy site, please notify your doctor.  These are signs of an infection.    If your biopsy site is bleeding, apply firm pressure for 15 minutes straight.  Repeat for another 15 minutes, if it is still bleeding.   If the surgical site continues to bleed, then please contact your doctor.      [facility]   DERMATOLOGY  [address] [address]   Dept: [phone]   Dept Fax: [phone]       CRYOSURGERY      Your doctor has used a method called cryosurgery to treat your skin condition. Cryosurgery refers to the use of very cold substances to treat a variety of skin conditions such as warts, pre-skin cancers, molluscum contagiosum, sun spots, and several benign growths. The substance we use in cryosurgery is liquid nitrogen and is so cold (-195 degrees Celsius) that is burns when administered.     Following treatment in the office, the skin may immediately burn and become red. You may find the area around the lesion is affected as well. It is sometimes necessary to treat not only the lesion, but a small area of the surrounding normal skin to achieve a good response.      A blister, and even a blood filled blister, may form after treatment.   This is a normal response. If the blister is painful, it is acceptable to sterilize a needle and with rubbing alcohol and gently pop the blister. It is important that you gently wash the area with soap and warm water as the blister fluid may contain wart virus if a wart was treated. Do no remove the roof of the blister.     The area treated can take anywhere from 1-3 weeks to heal. Healing time depends on the kind of skin lesion treated, the location, and how aggressively the lesion was treated. It is recommended that the areas treated are covered with Vaseline or bacitracin ointment and a band-aid. If a band-aid is not practical, just ointment applied several times per day will do. Keeping these areas moist will speed the healing time.    Treatment with liquid nitrogen can leave a scar. In dark skin, it may be a light or dark scar, in light skin it may be a white or pink scar. These will generally fade with time.    If you have any concerns after your treatment, please feel free to call the office.         [facility] DERMATOLOGY  [address] [address]  Dept: [phone]  Dept Fax: [phone]

## 2020-09-04 NOTE — PROGRESS NOTES
"Subjective:   Patient ID:  Jesse Hernandez Jr. is a 70 y.o. male who presents for heart transplant follow-up.    CMV Donor:   CMV Recipient:     HPI  He has PMH of OHTx 9/03, with significant CAV, HTN, stage II-III CKD, HLP who presents for 14 annual follow up. Patient underwent LHC earlier this year which demonstrated 60% RCA instent restenosis, and unchanged LAD/CMX disease. He then had PET stress to see if ischemia is an etiology of his progressive ALCANTAR/SOB symptoms. His Stress was normal. Today he presents feeling well. Had significant ALCANTAR lat year but since has improved. Has no chest pain, ALCANTAR, orthopnea. Able to do activities at home including yard work. Continues on prednisone 5 mg daily and prograf 2/3 mg. Triglycerides elevated which he reviewed with his son, he is working on diet.       DOing very well. Awaiting PET scan    Review of Systems   Constitution: Negative for chills, decreased appetite, fever and weight gain.   HENT: Negative for congestion.    Cardiovascular: Negative for chest pain, claudication, cyanosis, dyspnea on exertion, irregular heartbeat, orthopnea, paroxysmal nocturnal dyspnea and syncope.   Respiratory: Negative for cough and shortness of breath.    Musculoskeletal: Positive for falls. Negative for arthritis and back pain.   Gastrointestinal: Negative for nausea and vomiting.   Neurological: Negative for weakness.       Objective:   BP (!) 142/68 (BP Location: Left arm, Patient Position: Sitting, BP Method: Medium (Automatic))   Pulse 108   Ht 5' 10" (1.778 m)   Wt 76.2 kg (167 lb 15.9 oz)   BMI 24.10 kg/m²     Physical Exam   Constitutional: He appears well-developed and well-nourished. No distress.   HENT:   Head: Normocephalic.   Eyes: Pupils are equal, round, and reactive to light.   Neck: Normal range of motion. No JVD present.   Cardiovascular: Normal rate. Exam reveals no gallop and no friction rub.   No murmur heard.  Pulmonary/Chest: Effort normal. No respiratory distress. "   Abdominal: Soft. He exhibits no distension. There is no abdominal tenderness.   Musculoskeletal: Normal range of motion.         General: No edema.   Neurological: He is alert. No cranial nerve deficit.   Skin: Skin is warm. He is not diaphoretic. No erythema.         Chemistry        Component Value Date/Time     09/02/2020 0844    K 4.0 09/02/2020 0844     09/02/2020 0844    CO2 24 09/02/2020 0844    BUN 19 09/02/2020 0844    CREATININE 1.4 09/02/2020 0844     (H) 09/02/2020 0844        Component Value Date/Time    CALCIUM 9.7 09/02/2020 0844    ALKPHOS 78 09/02/2020 0844    AST 25 09/02/2020 0844    ALT 35 09/02/2020 0844    BILITOT 1.1 (H) 09/02/2020 0844    ESTGFRAFRICA 57.6 (A) 09/02/2020 0844    EGFRNONAA 49.8 (A) 09/02/2020 0844            Magnesium   Date Value Ref Range Status   09/02/2020 1.8 1.6 - 2.6 mg/dL Final       Lab Results   Component Value Date    WBC 12.76 (H) 09/02/2020    HGB 16.0 09/02/2020    HCT 49.8 09/02/2020    MCV 98 09/02/2020     (H) 09/02/2020       Lab Results   Component Value Date    INR 0.9 11/13/2015    INR 1.0 01/23/2015    INR 1.0 11/21/2014       BNP   Date Value Ref Range Status   09/02/2020 50 0 - 99 pg/mL Final     Comment:     Values of less than 100 pg/ml are consistent with non-CHF populations.   03/10/2020 54 0 - 99 pg/mL Final     Comment:     Values of less than 100 pg/ml are consistent with non-CHF populations.   12/09/2019 100 (H) 0 - 99 pg/mL Final     Comment:     Values of less than 100 pg/ml are consistent with non-CHF populations.       No results found for: LDH    Tacrolimus Lvl   Date Value Ref Range Status   09/02/2020 7.4 5.0 - 15.0 ng/mL Final     Comment:     Testing performed by Liquid Chromatography-Tandem  Mass Spectrometry (LC-MS/MS).  This test was developed and its performance characteristics  determined by [facility], Department of Pathology  and Laboratory Medicine in a manner consistent with  CLIA  requirements. It has not been cleared or approved by the [place]  Food and Drug Administration.  This test is used for clinical   purposes.  It should not be regarded as investigational or for  research.       No results found for: SIROLIMUS  No results found for: CYCLOSPORINE    Assessment:     1. Status post heart transplantation    2. Essential hypertension    3. Heart replaced by transplant    4. Coronary artery disease involving native artery of transplanted heart with unstable angina pectoris    5. 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 PET scan pernding    RTC annually      Return instructions as set forth by post transplant schedule or as needed:    Clinic: Return for labs and/or biopsy weekly the first month, every two weeks during month 2 and then monthly for the first year at the provider or coordinator's discretion. During the second year, return to clinic every 3 months. Post transplant year 3-5 return every 6 months. There will be a comprehensive post transplant evaluation every year that may include LHC/RHC/biopsy, stress test, echo, CXR, and other health screening exams.    In addition to the clinical assessment, I have ordered Allomap testing for this patient to assist in identification of moderate/severe acute cellular rejection (ACR) in a pt with stable Allograft function instead of endomyocardial biopsy.     Patient is reminded to call with any health changes as these can be early signs of transplant complications. Patient is advised to make sure any new medications or changes of old medications are discussed with a pharmacist or physician knowledgeable with transplant to avoid rejection/drug toxicity related to significant drug interactions.    UNOS Patient Status  Functional Status: 80% - Normal activity with effort: some symptoms of disease  Physical Capacity: No Limitations  Working for Income: No  If  no, reason not working: Patient Choice - Retired

## 2020-09-04 NOTE — LETTER
September 4, 2020        Iftikhar Houston  [address] [facility] [address]  Phone: [phone]  Fax: [phone]             [facility]  [address]  [facility] [address]  Phone: [phone]   Patient: Jesse Hernandez Jr.   MR Number: [id number]   YOB: 1950   Date of Visit: 9/4/2020       Dear Dr. Iftikhar Houston    Thank you for referring Jesse Hernandez to me for evaluation. Attached you will find relevant portions of my assessment and plan of care.    If you have questions, please do not hesitate to call me. I look forward to following Jesse Hernandez along with you.    Sincerely,    Felix Birch MD    Enclosure    If you would like to receive this communication electronically, please contact [email or web address] or [phone] to request NXVISION Link access.    NXVISION Link is a tool which provides read-only access to select patient information with whom you have a relationship. Its easy to use and provides real time access to review your patients record including encounter summaries, notes, results, and demographic information.    If you feel you have received this communication in error or would no longer like to receive these types of communications, please e-mail [email or web address]

## 2020-09-09 NOTE — PROGRESS NOTES
"Chief Complaint: LUTS    HPI:   9/9/20: No interval problems.  Moving to [place] soon.  Reviewed history in detail.   9/4/19: Voiding well no complaints.  Reviewed history in detail.   8/27/18: Been on flomax/finasteride and voiding better after last 6 months.  Reviewed history in detail.  2 cups coffee now.  2/26/18: Had a heart cath last week.  Voiding fine.  Doesn't feel he empties all the way.  Not taking the flomax hasn't missed it.  Drinks a lot of coffee, down to 3 cups a day.  8/23/17: No microhematuria observed at last visit.  No LUTS but mild retention.  8/23/16: Pt had some frequency and is taking a OTC supplement and it made things better; he hasn't taken it in three weeks but frequency is still better.  Is a heart transplant pt and this appt was made when he discussed his LUTS with the transplant service.  Overall feeling fine no problems to speak of.  Had a CT 6/16 that shows some renal cysts no other sig findings. Nocturia x2.  3/15: Elva: "65-year-old male returns to the clinic today for follow-up on his urinary infection.  Since his last visit when we had given him the antibiotics, his symptoms have cleared.  He is not having any urinary problems at present.  He still has nocturia ×1, but is otherwise doing very well. His urine culture did not show any growth, but his symptoms have cleared and his urinalysis today is also normal.  There was a small amount of blood on the dipstick, but only an occasional RBC under the microscope, and no leukocytes or nitrites."    Allergies:  Shellfish containing products    Medications:  has a current medication list which includes the following prescription(s): amlodipine, aspirin, atorvastatin, clopidogrel, finasteride, folic acid-vit b6-vit b12 2.5-25-2 mg, furosemide, hydralazine, magnesium, metformin, omega-3 acid ethyl esters, prednisone, tacrolimus, tamsulosin, doxycycline, ezetimibe, mupirocin, ranitidine, and zetia.    Review of Systems:  General: No fever, " chills, fatigability, or weight loss.  Skin: No rashes, itching, or changes in color or texture of skin.  Chest: Denies ALCANTAR, cyanosis, wheezing, cough, and sputum production.  Abdomen: Appetite fine. No weight loss. Denies diarrhea, abdominal pain, hematemesis, or blood in stool.  Musculoskeletal: No joint stiffness or swelling. Denies back pain.  : As above.  All other review of systems negative.    PMH:   has a past medical history of CHF (congestive heart failure), Coronary artery disease, Heart attack, Hyperlipidemia, Hypertension, Osteoarthritis of hip (7/25/2016), Squamous cell carcinoma (4/2015), and Tubular adenoma of colon (10/10/2012).    PSH:   has a past surgical history that includes Heart transplant; Hemorrhoid surgery; Parathyroid gland surgery; Tonsillectomy; Cardiac catheterization; Colonoscopy (N/A, 9/2/2016); Skin biopsy; Heart transplant; and Colonoscopy (N/A, 11/26/2018).    FamHx: family history includes Cancer in his sister; Heart attack in his brother, father, maternal grandfather, maternal grandmother, mother, and sister; Heart failure in his brother, father, maternal grandfather, maternal grandmother, and sister; Hypertension in his father and mother.    SocHx:  reports that he quit smoking about 4 years ago. His smoking use included pipe. He has never used smokeless tobacco. He reports that he does not drink alcohol or use drugs.      Physical Exam:  Vitals:    09/09/20 0929   BP: 122/70   Temp: 97.5 °F (36.4 °C)     General: A&Ox3, no apparent distress, no deformities  Neck: No masses, normal thyroid  Lungs: normal inspiration, no use of accessory muscles  Heart: normal pulse, no arrhythmias  Abdomen: Soft, NT, ND  Skin: The skin is warm and dry. No jaundice.  Ext: No c/c/e.  :   8/17: Test desc kenn, no abnormalities of epididymus. Penis normal, with normal penile and scrotal skin. Meatus normal. Normal rectal tone, no hemorrhoids. Prost 40 gm no nodules or masses appreciated. SV not  palpable. Perineum and anus normal.    Labs/Studies:   Urinalysis performed in clinic, summary: UA normal  PSA    6/16: 2.0    8/17: 2.5    8/19: 0.9    9/20: 0.8  Bladder Scan performed in office:     8/17:  ml.    2/26/18:  ml    Impression/Plan:   1.  Flomax/finasteride have allowed improvement.  Refill  2.  Low risk of prostate cancer, advised to see  in 1 year in his new home  3. HTN: stable today, takes his meds

## 2020-09-16 NOTE — TELEPHONE ENCOUNTER
----- Message from Holly Duncan sent at 9/16/2020 10:27 AM CDT -----  Regarding: missed call  Pt missed call and can be reached at [phone]      Thanks,  Holly Duncan

## 2020-10-20 NOTE — PROGRESS NOTES
Pt has moved to [place] near his son who is a cardiologist. He will receive his follow-up care with him.

## 2021-03-25 ENCOUNTER — POST MORTEM DOCUMENTATION (OUTPATIENT)
Dept: TRANSPLANT | Facility: CLINIC | Age: 71
End: 2021-03-25

## 2023-06-02 NOTE — TELEPHONE ENCOUNTER
"Spoke to pt regarding feet swelling. Did a lot of "walking" on a recent cruise but denies using salt or eating salty foods. Will increase his Lasix to 40 mg daily for three days and get lab on Friday 5/19.   "
Patient/Caregiver provided printed discharge information.

## 2023-12-05 NOTE — TELEPHONE ENCOUNTER
Dr. Pleitez cleared pt for dental work but stated that he should not stop plavix and asa. Will need SBE prophylaxis - Amoxicillin 2 gm prior to dental work.    Detail Level: Zone Plan: Has not used before; has OTC HCT at home; will call for rx if necessary Initiate Treatment: In Jan, 5-FU BID x 2 weeks to few larger lesions on forehead and R cheek Render In Strict Bullet Format?: No